# Patient Record
Sex: FEMALE | Race: WHITE
[De-identification: names, ages, dates, MRNs, and addresses within clinical notes are randomized per-mention and may not be internally consistent; named-entity substitution may affect disease eponyms.]

---

## 2017-04-25 ENCOUNTER — HOSPITAL ENCOUNTER (OUTPATIENT)
Dept: HOSPITAL 53 - M WUC | Age: 77
End: 2017-04-25
Attending: PHYSICIAN ASSISTANT
Payer: MEDICARE

## 2017-04-25 DIAGNOSIS — S93.422A: Primary | ICD-10-CM

## 2017-04-25 DIAGNOSIS — S93.602A: ICD-10-CM
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
## 2017-04-25 DIAGNOSIS — M77.32: ICD-10-CM
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
## 2017-04-25 NOTE — REP
Left foot four views:

 

Comparison is 12/06/2010.

 

The previous fracture of the base of the fifth digit metatarsal has healed in

satisfactory position alignment.

 

There is no acute fracture on the study today.  Mineralization joint spaces are

otherwise unremarkable and unchanged.

 

Calcaneal plantar and Achilles spurs are incidentally noted.

 

Impression:

 

Essentially negative left foot except for a calcaneal spurs.

 

 

Signed by

Jonatan Pickard MD 04/25/2017 12:35 P

## 2017-04-25 NOTE — REP
Left ankle four views :

 

There is no fracture or dislocation.

 

Mineralization and joint spaces are normal.

 

There are no calcifications or foreign bodies.

 

Impression:

 

Negative left ankle.  Calcaneal plantar and Achilles spurs are incidentally

noted.

 

 

Signed by

Jonatan Pickard MD 04/25/2017 12:33 P

## 2017-06-13 ENCOUNTER — HOSPITAL ENCOUNTER (OUTPATIENT)
Dept: HOSPITAL 53 - M LAB REF | Age: 77
End: 2017-06-13
Attending: FAMILY MEDICINE
Payer: MEDICARE

## 2017-06-13 DIAGNOSIS — N39.0: Primary | ICD-10-CM

## 2017-08-16 ENCOUNTER — HOSPITAL ENCOUNTER (OUTPATIENT)
Dept: HOSPITAL 53 - M LAB REF | Age: 77
End: 2017-08-16
Attending: NURSE PRACTITIONER
Payer: MEDICARE

## 2017-08-16 DIAGNOSIS — N39.0: Primary | ICD-10-CM

## 2018-07-26 ENCOUNTER — HOSPITAL ENCOUNTER (OUTPATIENT)
Dept: HOSPITAL 53 - M SDC | Age: 78
Discharge: HOME | End: 2018-07-26
Attending: SURGERY
Payer: MEDICARE

## 2018-07-26 DIAGNOSIS — I48.91: ICD-10-CM

## 2018-07-26 DIAGNOSIS — F32.9: ICD-10-CM

## 2018-07-26 DIAGNOSIS — Z96.1: ICD-10-CM

## 2018-07-26 DIAGNOSIS — I10: ICD-10-CM

## 2018-07-26 DIAGNOSIS — R29.898: ICD-10-CM

## 2018-07-26 DIAGNOSIS — Z92.21: ICD-10-CM

## 2018-07-26 DIAGNOSIS — K21.9: ICD-10-CM

## 2018-07-26 DIAGNOSIS — I25.2: ICD-10-CM

## 2018-07-26 DIAGNOSIS — R60.0: ICD-10-CM

## 2018-07-26 DIAGNOSIS — M12.9: ICD-10-CM

## 2018-07-26 DIAGNOSIS — Z96.641: ICD-10-CM

## 2018-07-26 DIAGNOSIS — Z79.82: ICD-10-CM

## 2018-07-26 DIAGNOSIS — E78.00: ICD-10-CM

## 2018-07-26 DIAGNOSIS — E03.9: ICD-10-CM

## 2018-07-26 DIAGNOSIS — Z78.0: ICD-10-CM

## 2018-07-26 DIAGNOSIS — Z79.899: ICD-10-CM

## 2018-07-26 DIAGNOSIS — C18.9: ICD-10-CM

## 2018-07-26 DIAGNOSIS — I25.119: ICD-10-CM

## 2018-07-26 DIAGNOSIS — K80.10: Primary | ICD-10-CM

## 2018-07-26 PROCEDURE — 47562 LAPAROSCOPIC CHOLECYSTECTOMY: CPT

## 2018-07-26 RX ADMIN — BUPIVACAINE HYDROCHLORIDE 1 ML: 2.5 INJECTION, SOLUTION EPIDURAL; INFILTRATION; INTRACAUDAL at 09:21

## 2018-11-07 ENCOUNTER — HOSPITAL ENCOUNTER (OUTPATIENT)
Dept: HOSPITAL 53 - M LAB REF | Age: 78
End: 2018-11-07
Attending: FAMILY MEDICINE
Payer: MEDICARE

## 2018-11-07 DIAGNOSIS — R10.9: Primary | ICD-10-CM

## 2018-11-07 LAB
AMYLASE SERPL-CCNC: 55 U/L (ref 25–115)
LIPASE: 233 U/L (ref 73–393)

## 2018-11-07 PROCEDURE — 82150 ASSAY OF AMYLASE: CPT

## 2018-11-14 ENCOUNTER — HOSPITAL ENCOUNTER (OUTPATIENT)
Dept: HOSPITAL 53 - M SDC | Age: 78
Discharge: HOME | End: 2018-11-14
Attending: OPHTHALMOLOGY
Payer: MEDICARE

## 2018-11-14 DIAGNOSIS — I25.2: ICD-10-CM

## 2018-11-14 DIAGNOSIS — E78.5: ICD-10-CM

## 2018-11-14 DIAGNOSIS — H25.9: Primary | ICD-10-CM

## 2018-11-14 DIAGNOSIS — I10: ICD-10-CM

## 2018-11-14 DIAGNOSIS — E03.9: ICD-10-CM

## 2018-11-14 DIAGNOSIS — I25.10: ICD-10-CM

## 2018-11-14 DIAGNOSIS — Z98.61: ICD-10-CM

## 2018-11-14 DIAGNOSIS — Z79.899: ICD-10-CM

## 2018-11-14 DIAGNOSIS — Z85.038: ICD-10-CM

## 2018-11-14 PROCEDURE — 66984 XCAPSL CTRC RMVL W/O ECP: CPT

## 2018-11-14 RX ADMIN — ACETYLCHOLINE CHLORIDE 1 DROP: KIT at 07:46

## 2018-11-14 RX ADMIN — ACETAZOLAMIDE 1 MG: 500 CAPSULE, EXTENDED RELEASE ORAL at 08:03

## 2018-11-14 RX ADMIN — LIDOCAINE HYDROCHLORIDE AND EPINEPHRINE 1 ML: 20; 5 INJECTION, SOLUTION EPIDURAL; INFILTRATION; INTRACAUDAL; PERINEURAL at 07:46

## 2018-11-14 RX ADMIN — OFLOXACIN 1 DROP: 3 SOLUTION/ DROPS OPHTHALMIC at 06:34

## 2018-11-14 RX ADMIN — TRIAMCINOLONE ACETONIDE 1 MG: 40 INJECTION, SUSPENSION OPHTHALMIC at 07:45

## 2018-11-14 RX ADMIN — Medication 1 MG: at 07:45

## 2018-11-14 RX ADMIN — LIDOCAINE HYDROCHLORIDE 1 ML: 10 INJECTION, SOLUTION EPIDURAL; INFILTRATION; INTRACAUDAL; PERINEURAL at 07:45

## 2018-11-14 RX ADMIN — LIDOCAINE HYDROCHLORIDE 1 %: 35 GEL OPHTHALMIC at 06:34

## 2018-11-14 RX ADMIN — POVIDONE-IODINE 1 DROP: 5 SOLUTION OPHTHALMIC at 07:45

## 2018-11-14 RX ADMIN — Medication 1 EA: at 07:45

## 2018-11-14 RX ADMIN — TROPICAMIDE 1 DROP: 10 SOLUTION/ DROPS OPHTHALMIC at 06:34

## 2018-11-14 RX ADMIN — CYCLOPENTOLATE HYDROCHLORIDE 1 DROP: 20 SOLUTION/ DROPS OPHTHALMIC at 06:33

## 2018-11-14 RX ADMIN — PHENYLEPHRINE HYDROCHLORIDE 1 DROP: 25 SOLUTION/ DROPS OPHTHALMIC at 06:33

## 2019-07-11 ENCOUNTER — HOSPITAL ENCOUNTER (OUTPATIENT)
Dept: HOSPITAL 53 - M LAB REF | Age: 79
End: 2019-07-11
Attending: FAMILY MEDICINE
Payer: MEDICARE

## 2019-07-11 DIAGNOSIS — R10.819: Primary | ICD-10-CM

## 2019-07-11 LAB
AMYLASE SERPL-CCNC: 54 U/L (ref 25–115)
LIPASE SERPL-CCNC: 250 U/L (ref 73–393)

## 2020-06-01 ENCOUNTER — HOSPITAL ENCOUNTER (OUTPATIENT)
Dept: HOSPITAL 53 - M LABSMTC | Age: 80
End: 2020-06-01
Attending: ANESTHESIOLOGY
Payer: MEDICARE

## 2020-06-01 DIAGNOSIS — Z11.59: ICD-10-CM

## 2020-06-01 DIAGNOSIS — Z01.818: Primary | ICD-10-CM

## 2020-06-04 ENCOUNTER — HOSPITAL ENCOUNTER (OUTPATIENT)
Dept: HOSPITAL 53 - M OPP | Age: 80
Discharge: HOME | End: 2020-06-04
Attending: SURGERY
Payer: MEDICARE

## 2020-06-04 VITALS — BODY MASS INDEX: 30.96 KG/M2 | HEIGHT: 65 IN | WEIGHT: 185.8 LBS

## 2020-06-04 VITALS — DIASTOLIC BLOOD PRESSURE: 75 MMHG | SYSTOLIC BLOOD PRESSURE: 134 MMHG

## 2020-06-04 DIAGNOSIS — I25.2: ICD-10-CM

## 2020-06-04 DIAGNOSIS — E03.9: ICD-10-CM

## 2020-06-04 DIAGNOSIS — Z98.0: ICD-10-CM

## 2020-06-04 DIAGNOSIS — Z12.11: Primary | ICD-10-CM

## 2020-06-04 DIAGNOSIS — Z80.0: ICD-10-CM

## 2020-06-04 DIAGNOSIS — Z79.82: ICD-10-CM

## 2020-06-04 DIAGNOSIS — D12.2: ICD-10-CM

## 2020-06-04 DIAGNOSIS — Z85.038: ICD-10-CM

## 2020-06-04 DIAGNOSIS — Z79.899: ICD-10-CM

## 2020-06-04 NOTE — ROOR
________________________________________________________________________________

Patient Name: Juliann Peng            Procedure Date: 6/4/2020 7:27 AM

MRN: [id number]                          Account Number: [id number]

YOB: 1940                Age: 80

Room: [facility]                            Gender: Female

Note Status: Finalized                 

________________________________________________________________________________

 

Procedure:           Colonoscopy

Indications:         High risk colon cancer surveillance: Personal history of 

                     colon cancer, Last colonoscopy: August 2016, Patient had 

                     a left hemicolectomy for cancer in 2007

Providers:           Howard Cano MD

Referring MD:        Shravan Huertas MD

Requesting Provider: 

Medicines:           Monitored Anesthesia Care

Complications:       No immediate complications.

________________________________________________________________________________

Procedure:           Pre-Anesthesia Assessment:

                     - Prior to the procedure, a History and Physical was 

                     performed, and patient medications and allergies were 

                     reviewed. The patient is competent. The risks and 

                     benefits of the procedure and the sedation options and 

                     risks were discussed with the patient. All questions were 

                     answered and informed consent was obtained. Patient 

                     identification and proposed procedure were verified by 

                     the physician, the nurse and the anesthetist in the 

                     procedure room. Mental Status Examination: alert and 

                     oriented. Airway Examination: normal oropharyngeal airway 

                     and neck mobility. Prophylactic Antibiotics: The patient 

                     does not require prophylactic antibiotics. Prior 

                     Anticoagulants: The patient has taken no previous 

                     anticoagulant or antiplatelet agents. ASA Grade 

                     Assessment: III - A patient with severe systemic disease. 

                     After reviewing the risks and benefits, the patient was 

                     deemed in satisfactory condition to undergo the 

                     procedure. The anesthesia plan was to use monitored 

                     anesthesia care (MAC). Immediately prior to 

                     administration of medications, the patient was 

                     re-assessed for adequacy to receive sedatives. The heart 

                     rate, respiratory rate, oxygen saturations, blood 

                     pressure, adequacy of pulmonary ventilation, and response 

                     to care were monitored throughout the procedure. The 

                     physical status of the patient was re-assessed after the 

                     procedure.

                     The Colonoscope was introduced through the anus and 

                     advanced to the cecum, identified by appendiceal orifice 

                     and ileocecal valve. The colonoscopy was performed 

                     without difficulty. The patient tolerated the procedure 

                     well. The quality of the bowel preparation was excellent.

                                                                                

Findings:

     The perianal and digital rectal examinations were normal.

     A 3 mm polyp was found in the proximal ascending colon. The polyp was 

     sessile. The polyp was removed with a jumbo cold forceps. Resection and 

     retrieval were complete. The pathology specimen was placed into Bottle 

     Number 1.

     There was evidence of a prior functional end-to-end colo-colonic 

     anastomosis in the proximal descending colon. This was patent and was 

     characterized by healthy appearing mucosa.

                                                                                

Impression:          - One 3 mm polyp in the proximal ascending colon, removed 

                     with a jumbo cold forceps. Resected and retrieved.

                     - Patent functional end-to-end colo-colonic anastomosis, 

                     characterized by healthy appearing mucosa.

Recommendation:      - Discharge patient to home.

                     - Resume previous diet.

                     - Continue present medications.

                     - Await pathology results.

                     - Repeat colonoscopy in 3 - 5 years for surveillance.

                                                                                

 

Howard Cano MD

__________________

Howard Cano MD

6/4/2020 7:56:05 AM

Electronically signed by Howard Cano MD

Number of Addenda: 0

 

Note Initiated On: 6/4/2020 7:27 AM

Estimated Blood Loss:

     Estimated blood loss: none.

## 2020-07-06 ENCOUNTER — HOSPITAL ENCOUNTER (OUTPATIENT)
Dept: HOSPITAL 53 - M WHC | Age: 80
End: 2020-07-06
Attending: SURGERY
Payer: MEDICARE

## 2020-07-06 DIAGNOSIS — N63.12: Primary | ICD-10-CM

## 2020-07-06 DIAGNOSIS — R59.9: ICD-10-CM

## 2020-07-06 DIAGNOSIS — Z79.899: ICD-10-CM

## 2020-07-06 DIAGNOSIS — Z79.82: ICD-10-CM

## 2020-07-06 NOTE — REP
ULTRASOUND RIGHT BREAST:

 

Real-time sonographic evaluation of the right breast performed.   Patient reports

right axillary soreness.  There is a morphologically normal appearing lymph node

in the right axilla with an echogenic fatty hilum.  It measures 1.7 x 0.6 x 1.4

cm.

 

Reportedly there is a palpable lump at 12-o'clock position 10 cm from the nipple.

At that location, a small hypoechoic area is seen with distal shadowing. This

measures approximately 3 mm.  This is probably benign.

 

IMPRESSION:

 

ACR3 probably benign.  Right axillary lymph node appears morphologically normal

in appearance with a normal short axis dimension.

 

At 12-o'clock position, a small 3 mm hypoechoic focus demonstrates distal

acoustic shadowing.  This is probably benign.  Recommend other 6-month followup

ultrasound with mammographic correlation, or ultrasound-guided biopsy.

## 2020-07-08 ENCOUNTER — HOSPITAL ENCOUNTER (OUTPATIENT)
Dept: HOSPITAL 53 - M WHCPRO | Age: 80
End: 2020-07-08
Attending: SURGERY
Payer: MEDICARE

## 2020-07-08 VITALS — DIASTOLIC BLOOD PRESSURE: 82 MMHG | SYSTOLIC BLOOD PRESSURE: 158 MMHG

## 2020-07-08 DIAGNOSIS — C50.311: Primary | ICD-10-CM

## 2020-07-08 DIAGNOSIS — N63.14: ICD-10-CM

## 2020-07-08 DIAGNOSIS — N63.12: ICD-10-CM

## 2020-07-08 NOTE — REP
ULTRASOUND GUIDANCE FOR TWO RIGHT BREAST BIOPSIES:

 

Real-time sonographic evaluation and ultrasound guidance provided for Dr. Newell for an ultrasound guided biopsy of a nodule at 4 -o'clock position

right breast. The nodule is seen on the sonographic images and a biopsy needle is

seen within the nodule.

 

Sonographic guidance is also provided biopsy of a hypoechoic nodule at 12

-o'clock position approximately 10 cm from the nipple.

## 2020-07-08 NOTE — REP
POST BIOPSY MAMMOGRAM RIGHT BREAST:

 

Postbiopsy mammogram right breast performed following ultrasound-guided biopsy of

two separate areas in the right breast.

 

A biopsy clip is seen at the previously identified nodule in the lower inner

right breast, originally seen by mammography at [facility]

05/19/2020.  Metallic clip is seen at the 12 -o'clock region at the site of a

second ultrasound biopsy performed today.  There was no mammographic abnormality

at that location.

## 2020-07-13 ENCOUNTER — HOSPITAL ENCOUNTER (OUTPATIENT)
Dept: HOSPITAL 53 - M PLALAB | Age: 80
End: 2020-07-13
Attending: SURGERY
Payer: MEDICARE

## 2020-07-13 DIAGNOSIS — Z13.79: Primary | ICD-10-CM

## 2020-07-14 ENCOUNTER — HOSPITAL ENCOUNTER (OUTPATIENT)
Dept: HOSPITAL 53 - M PLALAB | Age: 80
End: 2020-07-14
Attending: SURGERY
Payer: MEDICARE

## 2020-07-14 DIAGNOSIS — C50.311: Primary | ICD-10-CM

## 2020-07-14 LAB
BUN SERPL-MCNC: 17 MG/DL (ref 7–18)
CALCIUM SERPL-MCNC: 8.6 MG/DL (ref 8.8–10.2)
CHLORIDE SERPL-SCNC: 109 MEQ/L (ref 98–107)
CO2 SERPL-SCNC: 28 MEQ/L (ref 21–32)
CREAT SERPL-MCNC: 1.19 MG/DL (ref 0.55–1.3)
GFR SERPL CREATININE-BSD FRML MDRD: 46.5 ML/MIN/{1.73_M2} (ref 32–?)
GLUCOSE SERPL-MCNC: 98 MG/DL (ref 70–100)
POTASSIUM SERPL-SCNC: 4.9 MEQ/L (ref 3.5–5.1)
SODIUM SERPL-SCNC: 142 MEQ/L (ref 136–145)

## 2020-07-19 NOTE — ROOPDOC
[facility] Report Of Operation


Report of Operation


DATE OF PROCEDURE: 7/8/20





PREPROCEDURE DIAGNOSES: Right breast mass at 4:00 and 12:00





POSTPROCEDURE DIAGNOSES: Right breast mass at 4:00 and 12:00.





PROCEDURE: Ultrasound-guided biopsy of the right breast mass at 4:00 and 12:00 

with clip placement at both biopsy sites





SURGEON: Robe Dietz





ANESTHESIA: Local.





ESTIMATED BLOOD LOSS: Approximately 1 mL. 





COMPLICATIONS: none  





REMARKS: Post-biopsy mammogram of the right breast was obtained and showed clips

in expected position. Shape 4 clip was at the 4:00 mass location. Postprocedural

dressing was placed.





DESCRIPTION OF PROCEDURE: 





Lidocaine 1% -9729 Expiration 05/2023


Sodium Bicarbonate 8.4% LOT 06 313 EV Expiration 06/2021





4:00 BIOPSY SITE 


Hydromark clip LOT [id number] Expiration 02/2023 SHAPE 4  


Bx device: BARD [id number] x10 cm  LOT HU EQ 0691  Expiration 02/2023 





12:00 BIOPSY SITE 


Hydromark clip LOT [id number] Expiration 01/2023 SHAPE 3  


Bx device: BARD [id number] x10 cm  LOT HU EQ 0691  Expiration 02/2023





Informed consent was obtained. The most common risk and possible complications 

including bleeding, hematoma, bruising, infection, injury to surrounding 

structures were explained to the patient and she expressed understanding. 





Patient was placed on the bed in the supine position.  Appropriate time out was 

done stating patients name, date of birth, and the procedure to be performed. 

The right breast was prepped and draped in the usual fashion. The ultrasound was

used to confirm the location of the lesions in the right breast at 4:00 4CFN and

at 12:00 7CFN. 





Procedure was started with 4:00 4 cm from the nipple lesion. Plain Lidocaine 1% 

and 8.4% sodium bicarbonate 10:1 mix was used to anesthetize the skin, the 

biopsy site and tissues along the anticipated biopsy tract. Small skin incision 

was made with blade number 11.  BARD Marquee 14G cannula with introducer 

([id number]) was inserted through the incision and advanced under the ultrasound 

guidance to position immediately adjacent to the lesion. Next, the introducer 

was removed and BARD Marquee 14G biopsy device was places in the cannula. Pre-

biopsy imaging, and post-biopsy imaging were captured. Five good core biopsies 

were taken at various levels of the lesion. Specimen was placed in formaldehyde,

labeled with appropriate biopsy site and patients name, and sent to pathology 

for evaluation.





Next, the biopsy device was withdrawn and a clip introducer was inserted into 

the biopsy site via the cannula. The SHAPE 4 Hydromark clip was deployed under 

sonographic guidance. Post-clip placement image was captured. 





Manual pressure over the biopsy cavity and tract was held after the clip 

introducer was withdrawn.  No bleeding was noted upon removal of the pressure. 





Next, our attention was shifted toward 12:00 7 cm from the nipple lesion. Plain 

Lidocaine 1% and 8.4% sodium bicarbonate 10:1 mix was used to anesthetize the 

skin, the biopsy site and tissues along the anticipated biopsy tract. Small skin

incision was made with blade number 11.  BARD Marquee 14G cannula with 

introducer ([id number]) was inserted through the incision and advanced under the 

ultrasound guidance to position immediately adjacent to the lesion. Next, the 

introducer was removed and BARD Marquee 14G biopsy device was places in the 

cannula. Pre-biopsy imaging, and post-biopsy imaging were captured. Five good 

core biopsies were taken at various levels of the lesion. Specimen was placed in

formaldehyde, labeled with appropriate biopsy site and patients name, and sent 

to pathology for evaluation.





Next, the biopsy device was withdrawn and a clip introducer was inserted into 

the biopsy site via the cannula. The SHAPE 3 Hydromark clip was deployed under 

sonographic guidance. Post-clip placement image was captured. 





Manual pressure over the biopsy cavity and tract was held after the clip intro

ducer was withdrawn.  No bleeding was noted upon removal of the pressure. 





Post-biopsy mammogram of the right breast was obtained and showed clips in 

expected position. Shape 4 clip was at the 4:00 mass location. Postprocedural 

dressing was placed.





Patient tolerated procedure well. Discharge instructions were discussed with the

patient and she expressed understanding.














ROBE DIETZ DO     Jul 19, 2020 15:29

## 2020-08-05 ENCOUNTER — HOSPITAL ENCOUNTER (OUTPATIENT)
Dept: HOSPITAL 53 - M WHC | Age: 80
End: 2020-08-05
Attending: SURGERY
Payer: MEDICARE

## 2020-08-05 DIAGNOSIS — C50.911: Primary | ICD-10-CM

## 2020-08-18 ENCOUNTER — HOSPITAL ENCOUNTER (OUTPATIENT)
Dept: HOSPITAL 53 - M RAD | Age: 80
End: 2020-08-18
Attending: INTERNAL MEDICINE
Payer: MEDICARE

## 2020-08-18 DIAGNOSIS — Z95.1: ICD-10-CM

## 2020-08-18 DIAGNOSIS — K86.2: ICD-10-CM

## 2020-08-18 DIAGNOSIS — N63.15: ICD-10-CM

## 2020-08-18 DIAGNOSIS — I25.10: ICD-10-CM

## 2020-08-18 DIAGNOSIS — I71.00: Primary | ICD-10-CM

## 2020-08-18 PROCEDURE — 71275 CT ANGIOGRAPHY CHEST: CPT

## 2020-08-20 ENCOUNTER — HOSPITAL ENCOUNTER (OUTPATIENT)
Dept: HOSPITAL 53 - M LABSMTC | Age: 80
End: 2020-08-20
Attending: ANESTHESIOLOGY
Payer: MEDICARE

## 2020-08-20 DIAGNOSIS — Z01.812: Primary | ICD-10-CM

## 2020-08-20 DIAGNOSIS — Z20.828: ICD-10-CM

## 2020-08-25 ENCOUNTER — HOSPITAL ENCOUNTER (OUTPATIENT)
Dept: HOSPITAL 53 - M RADPRO | Age: 80
LOS: 1 days | Discharge: HOME | End: 2020-08-26
Attending: SURGERY
Payer: MEDICARE

## 2020-08-25 VITALS — DIASTOLIC BLOOD PRESSURE: 80 MMHG | SYSTOLIC BLOOD PRESSURE: 136 MMHG

## 2020-08-25 VITALS — HEIGHT: 60 IN | BODY MASS INDEX: 38.74 KG/M2 | WEIGHT: 197.31 LBS

## 2020-08-25 DIAGNOSIS — I25.10: ICD-10-CM

## 2020-08-25 DIAGNOSIS — C50.911: Primary | ICD-10-CM

## 2020-08-25 DIAGNOSIS — Z79.82: ICD-10-CM

## 2020-08-25 DIAGNOSIS — Z17.0: ICD-10-CM

## 2020-08-25 DIAGNOSIS — Z98.61: ICD-10-CM

## 2020-08-25 DIAGNOSIS — K21.9: ICD-10-CM

## 2020-08-25 DIAGNOSIS — Z85.038: ICD-10-CM

## 2020-08-25 DIAGNOSIS — Z79.899: ICD-10-CM

## 2020-08-25 DIAGNOSIS — I10: ICD-10-CM

## 2020-08-25 DIAGNOSIS — Z90.49: ICD-10-CM

## 2020-08-25 LAB
BUN SERPL-MCNC: 20 MG/DL (ref 7–18)
CALCIUM SERPL-MCNC: 8.8 MG/DL (ref 8.8–10.2)
CHLORIDE SERPL-SCNC: 110 MEQ/L (ref 98–107)
CO2 SERPL-SCNC: 25 MEQ/L (ref 21–32)
CREAT SERPL-MCNC: 1.33 MG/DL (ref 0.55–1.3)
GFR SERPL CREATININE-BSD FRML MDRD: 40.9 ML/MIN/{1.73_M2} (ref 32–?)
GLUCOSE SERPL-MCNC: 138 MG/DL (ref 70–100)
HCT VFR BLD AUTO: 33.8 % (ref 36–47)
HGB BLD-MCNC: 11.4 G/DL (ref 12–15.5)
MCH RBC QN AUTO: 33.9 PG (ref 27–33)
MCHC RBC AUTO-ENTMCNC: 33.7 G/DL (ref 32–36.5)
MCV RBC AUTO: 100.6 FL (ref 80–96)
PLATELET # BLD AUTO: 132 10^3/UL (ref 150–450)
POTASSIUM SERPL-SCNC: 4.4 MEQ/L (ref 3.5–5.1)
RBC # BLD AUTO: 3.36 10^6/UL (ref 4–5.4)
SODIUM SERPL-SCNC: 142 MEQ/L (ref 136–145)
WBC # BLD AUTO: 7.8 10^3/UL (ref 4–10)

## 2020-08-25 PROCEDURE — 86850 RBC ANTIBODY SCREEN: CPT

## 2020-08-25 PROCEDURE — 78195 LYMPH SYSTEM IMAGING: CPT

## 2020-08-25 PROCEDURE — 80048 BASIC METABOLIC PNL TOTAL CA: CPT

## 2020-08-25 PROCEDURE — 86901 BLOOD TYPING SEROLOGIC RH(D): CPT

## 2020-08-25 PROCEDURE — 88305 TISSUE EXAM BY PATHOLOGIST: CPT

## 2020-08-25 PROCEDURE — 38525 BIOPSY/REMOVAL LYMPH NODES: CPT

## 2020-08-25 PROCEDURE — 88307 TISSUE EXAM BY PATHOLOGIST: CPT

## 2020-08-25 PROCEDURE — 85027 COMPLETE CBC AUTOMATED: CPT

## 2020-08-25 PROCEDURE — 76942 ECHO GUIDE FOR BIOPSY: CPT

## 2020-08-25 PROCEDURE — 19125 EXCISION BREAST LESION: CPT

## 2020-08-25 PROCEDURE — 19126 EXCISION ADDL BREAST LESION: CPT

## 2020-08-25 PROCEDURE — 36415 COLL VENOUS BLD VENIPUNCTURE: CPT

## 2020-08-25 PROCEDURE — 86900 BLOOD TYPING SEROLOGIC ABO: CPT

## 2020-08-25 RX ADMIN — Medication SCH EA: at 09:00

## 2020-08-25 NOTE — ROOPDOC
[facility] Report Of Operation


Report of Operation


DATE OF PROCEDURE: 8/25/20





PREPROCEDURE DIAGNOSES: Right breast cancer and right breast suspicious lesion





POSTPROCEDURE DIAGNOSES: same





PROCEDURE: Right breast lumpectomy x 2 with intraop wire placement x2 and right 

sentinel lymph node biopsy





SURGEON: Robe Saavedra





ASSISTANT: 





ANESTHESIA: general





ESTIMATED BLOOD LOSS: Approximately 25 mL. 





COMPLICATIONS: none 





REMARKS: clip was identified during dissection of 12:00 lesion and removed from 

the tissue to avoid misplacement, The clip is present in the 4:00 lumpectomy 

specimens. Both specimens contain wires








DESCRIPTION OF PROCEDURE: 





INDICATIONS: 


Ms. Peng is an 80-year-old woman who was found to have a suspicious right 

breast mass located at 4:00 on screening mammogram. This was evaluated with US 

and sonographic correlate was found and biopsy was recommended. On clinical 

breast exam there was another area of palpable lesion at 12:00.  This lesion was

evaluated with focal US of this lesion and category BIRADS 3 was assigned to 

this study and either biopsy or image follow up was recommended. Patient decided

that she would like to have biopsy of both lesions. 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 US 

guided biopsy of the 12:00 lesion came back as fat necrosis. MRI of the breast 

was done and showed abnormal contrast enhancement at the site of known cancer 

and some suspicious enhancement at the site of 12:00 biopsy. Malignancy could 

not be ruled out per radiology report.  We have discussed various treatment 

options and patient decided that she would want to have both lesions removed. 

She opted for breast conservative surgery with sentinel lymph node biopsy on the

right. She was medically cleared for surgery by her primary care doctor and by 

the cardiology since patient has a hx of open heart surgery.





Risks and possible complications of surgical procedure including bleeding, 

infection and injury to surrounding structures were explained to the patient and

she wished to proceed. Consent was signed. My initials were placed on the 

operative site. Subcutaneous injection of 5000 units of heparin was done in 

Preop. The injection of radioactive tracer was done in radiology department 

preoperatively. Lymphoscintigraphy imaging was reviewed in preop. 





DETAILS:


Patient was taken to the operating room and placed on the operating room table. 

A sign in was called stating patients name, date of birth and the procedure to 

be done. Preoperative antibiotics were infused. Smooth induction of general 

anesthesia was done. Patients hands were extended on arm rests.  Care was taken 

not to over extend the arms.  





Procedure was started with right breast intraop wire localization of 12:00 

lesion and 4:00 lesion. Appropriate time out was done and patients name, date 

of birth, and the procedure to be done were confirmed. Right breast was cleaned 

by me. Intraoperative ultrasound was used to confirm location of the Hydromark 

clip and 12:00 and at 4:00. Location of the clip was marked on the skin as well 

at both locations. We started with wire placement at the 12:00 lesion.  21 G 

Kopans Breast Lesion Localization Needle was used to place 25 cm wire through 

the clip and the end and the wire was passed a centimeter deep. The images were 

captured confirming adequate placement of the localizing wire. Next, we 

proceeded with wire placement at 4:00. 21 G Kopans Breast Lesion Localization 

Needle was used to place 25 cm wire through the lesion. The clip was identified 

immediately next to the lesion. The images were captured confirming adequate 

placement of the localizing wire. Sonographer assisted with the wire placement. 





Next, patients right breast and axilla were prepped and draped in the usual 

fashion. Care was taken not to displace the wire. Appropriate time out was done 

again prior second part of the procedure. Patients name, date of birth, and the

procedure to be done were confirmed. 





Procedure was started with sentinel lymph node biopsy. Neoprobe was used to 

locate area of maximum intensity of the signal. Local anesthetic using 1% 

lidocaine and 0.25 % Marcaine 50/50 mix was injected. An incision was made with 

scalpel number 15 at the inferior aspect of axillary hair line in the right 

axilla where the maximum signal was identified. The sharp and blunt dissection 

was continued through the subcutaneous adipose tissue. Clavipectoral fascia was 

opened. Neoprobe was used to guide the dissection.  First sentinel lymph node 

was identified and excised. The ex-vivo 10 second count was 5001. Second 

sentinel lymph node was next to the first node. This was also excised. The ex-

vivo 10 second count was 17. The third sentinel lymph node was identified and 

excised. The ex-vivo 10 second count was 3120. The specimens were labeled with 

patients name and sent to pathology. No additional lymph nodes with high 

radioactive signal were identified. The 10 second count of the background was 

81.  Adequate hemostasis was assured. Additional local anesthetic was injected 

into surrounding tissues.





At this time, the axillary incision was used to access the lesion locates at 

12:00. The tissue flaps were raised from the incision site toward the 12:00 

Hydromark clip. The previously placed guide wire was carefully pulled into the 

wound. Hydromark clip was identified with intraoperative US using hockey stick 

probe. Dissection was carried toward the Hydromark along the wire. Hydromark 

clip was noted to be visible during tissue dissection and removed to avoid losin

g the clip. This was later imaged with the specimen. Black stitch was placed 

into the specimen at the site where clip was present. Surrounding tissue along 

the wire and the previously identified clip site was excised. The specimen was 

carefully removed from the breast keeping its proper orientation and moved to 

the back table where margins were marked with the surgical inking kit following 

the standard colors recommendations. Specimen was then placed on the grid and 

placed in [employer] [employer] [facility]. The image revealed the wire. The 

Hydromark clip previously identified in the tissue was placed next to the 

specimen. The specimen was labeled with patients name and right 12:00 

lumpectomy and sent to pathology. 





Axillary and breast cavities were was irrigated. Breast dead space was 

approximated with 2-0 Vicryl. Clavipectoral fascia was closed with 3-0 Vicryl 

interrupted suture. Dermal layer was closed at the end of the case with 3-0 

Monocryl and skin was closed with 4-0 Monocryl. Surgical glue was applied to the

incision at the end of the procedure. 





Next, our attention was turned toward the right 4:00 cancer site. Local anest

hetic using 1% lidocaine and 0.25 % Marcaine 50/50 mix was injected at the site 

of planned periareolar incision. The incision was made with the scalpel. 

Subcutaneous skin flaps were raised and the guide wire was carefully pulled into

the wound. Dissection was carries along the wire until the previously marked on 

the skin area of target lesion location was encountered.  At this point, wider 

excision of the tissue surrounding the wire was done. The Hydromark clip was 

identified in the tissue with intraoperative hockey stick ultrasound probe. 

Palpation of the mass was guiding the dissection.  The lumpectomy specimen was 

carefully removed from the breast keeping its proper orientation and moved to 

the back table where margins were marked with the surgical inking kit following 

the standard colors recommendations. Specimen was then placed on the grid and 

placed in [employer] [employer] [facility]. The image revealed the wire inside the

mass and the Hydromark in the specimen. The specimen was labeled with patients 

name and right 4:00 lumpectomy and sent to pathology. 





Next, six additional margins were taken: deep, inferior, superior, medial, 

anterior and lateral.  All new margins, defined as margin farthest away from 

lumpectomy cavity, were marked with black ink. Each margin was sent as a 

separate specimen with appropriate labeling. Wound was thoroughly irrigated. 

Adequate hemostasis was assured. Additional local anesthetic was injected into 

surrounding tissues. Clips were placed to megan the cavity. Dead space was 

approximated with 2-0 Vicryl. The dermis was closed with 3-0 Monocryl and skin 

was closed with 4-0 Monocryl.  Surgical glue was placed over the incision. 





Patient emerged from the anesthesia without any problems. Fluffs were placed 

over the operative site and patients chest was wrapped snuggly in the ACE wrap.







Sponge and instrument counts were done and were correct.





Patient tolerated procedure well and was taken to recovery unit in stable 

condition.




















ROBE DIETZ DO     Aug 25, 2020 21:44

## 2020-08-25 NOTE — HPEPDOC
[facility] Medical History & Physical


Date of Admission


Aug 25, 2020


Date of Service:  Aug 25, 2020


Attending Physician:  NATHAN SKY MD





History and Physical


ATTENDING: Dr. Nathan Sky 


Surgeon: Dr. Newell 





CHIEF COMPLAINT: High blood pressure





HISTORY OF PRESENT ILLNESS: 


81 y/o F with PMHx CAD s/p CABGx5  in 2004, Colon ca s/p resection 2007, HTN, 

GERD who presents for lumpectomy and sentinel node dissection for concern of 

suspicious breast lesion.  





Pt seen post op mildly hypertensive [id number]. Pt did not take her BP meds 

today. 





Pt denies SOB/palpitations. No N/V/abd pain. Overall feels ok. 





We have been asked to keep pt overnight and monitor given prolonged OR time and 

cardiac history. 





PAST MEDICAL HISTORY: As per HPI





PAST SURGICAL HISTORY: CABG, Colectomy for colon ca, hip surg, right eye surg





SOCIAL HISTORY: Denies tobacco, alcohol use.





FAMILY HISTORY: Non contributory





ALLERGIES: Please see below.





REVIEW OF SYSTEMS:


HEENT: Denies sore throat/headache


CARDIOVASCULAR: Denies palpitations


RESPIRATORY: Denies shortness of breath/cough


GASTROINTESTINAL: denies nausea/vomiting


GENITOURINARY: Denies dysuria/urinary urgency.


MUSCULOSKELETAL: Denies myalgias/arthralgias


NEUROLOGICAL: Denies any focal weakness


HOME MEDICATIONS: Please see below. 


PHYSICAL EXAMINATION:





Vitals: (see below) 


General: No acute distress, laying comfortably in bed.


HEENT: Moist mucous membranes.


Neck: No JVD or lymphadenopathy


Cardiac: RRR, No murmurs. Chest wall with ACE bandage


Pulm: Clear to auscultation b/l. No wheezing, rhonchi


Abd: NT/ND + BS


Ext: No edema or cyanosis. Distal pulses intact 





LABORATORY DATA: See below.


 


ASSESSMENT/PLAN: 


1. POD #0 s/p lumpectomy x2 with sentinel lymph node biopsy. - management per 

Dr. Newell


2. HTN - uncontrolled. Restart Lisinopril. hydralazine IV PRN


3. H/o CAD s/p cabg - restart home meds, except ASA until cleared by surg


4. H/o colon ca s/p resection 


5. H/o gerd on ppi 





DVT Prophy: SCDs





Pt expected to be hospitalized for <2midnights for the monitoring of the above.





Vital Signs





Vital Signs








  Date Time  Temp Pulse Resp B/P (MAP) Pulse Ox O2 Delivery O2 Flow Rate FiO2


 


8/25/20 18:01   20     


 


8/25/20 17:40 97.7 65  165/73 (103) 97 Room Air  


 


8/25/20 16:25       2 











Home Medications


Scheduled


Aspirin (Ecotrin) 81 Mg Tablet.dr, 81 MG PO DAILY


Cholecalciferol (Vitamin D3) (Vitamin D3) 25 Mcg Capsule, 25 MCG PO DAILY


Levothyroxine Sodium (Levothyroxine Sodium) 88 Mcg Tab, 88 MCG PO ASDIRECTED


   TUESDAY, WED, FRIDAY, SAT, SUN 


Levothyroxine Sodium (Levo-T) 75 Mcg Tablet, 75 MCG PO 2XW


   MONDAY AND THURSDAY 


Lisinopril (Lisinopril) 2.5 Mg Tablet, 2.5 MG PO DAILY


Multivitamin (Multivitamins) 1 Cap Cap, 1 CAP PO DAILY


Nitroglycerin (Nitroglycerin) 0.4 Mg Sub, 0.4 MG SL ASDIRECTED for chest pain


   1st sign of attack; may repeat every 5 mins; if pain persists after 3 in 15 

min, medical attention is recommended 


Pantoprazole Sodium (Pantoprazole Sodium) 40 Mg Tab, 40 MG PO DAILY


Simvastatin (Simvastatin) 40 Mg Tab, 40 MG PO DAILY





Scheduled PRN


Acetaminophen (Tylenol) 325 Mg Tablet, 650 MG PO Q4-6HP PRN for PAIN OR FEVER


Tramadol HCl (Ultram) 50 Mg Tablet, 50 MG PO every 6 hours PRN for pain





Allergies


Coded Allergies:  


     No Known Allergies (Unverified , 11/13/18)





A-FIB/CHADSVASC


A-FIB History


Current/History of A-Fib/PAF?:  No











NATHAN SKY MD                 Aug 25, 2020 18:16

## 2020-08-26 VITALS — SYSTOLIC BLOOD PRESSURE: 131 MMHG | DIASTOLIC BLOOD PRESSURE: 82 MMHG

## 2020-08-26 VITALS — DIASTOLIC BLOOD PRESSURE: 70 MMHG | SYSTOLIC BLOOD PRESSURE: 136 MMHG

## 2020-08-26 VITALS — SYSTOLIC BLOOD PRESSURE: 135 MMHG | DIASTOLIC BLOOD PRESSURE: 76 MMHG

## 2020-08-26 VITALS — SYSTOLIC BLOOD PRESSURE: 152 MMHG | DIASTOLIC BLOOD PRESSURE: 74 MMHG

## 2020-08-26 LAB
BUN SERPL-MCNC: 19 MG/DL (ref 7–18)
CALCIUM SERPL-MCNC: 8.8 MG/DL (ref 8.8–10.2)
CHLORIDE SERPL-SCNC: 111 MEQ/L (ref 98–107)
CO2 SERPL-SCNC: 26 MEQ/L (ref 21–32)
CREAT SERPL-MCNC: 1.17 MG/DL (ref 0.55–1.3)
GFR SERPL CREATININE-BSD FRML MDRD: 47.4 ML/MIN/{1.73_M2} (ref 32–?)
GLUCOSE SERPL-MCNC: 93 MG/DL (ref 70–100)
HCT VFR BLD AUTO: 31.7 % (ref 36–47)
HGB BLD-MCNC: 10.5 G/DL (ref 12–15.5)
MCH RBC QN AUTO: 33.2 PG (ref 27–33)
MCHC RBC AUTO-ENTMCNC: 33.1 G/DL (ref 32–36.5)
MCV RBC AUTO: 100.3 FL (ref 80–96)
PLATELET # BLD AUTO: 128 10^3/UL (ref 150–450)
POTASSIUM SERPL-SCNC: 4.1 MEQ/L (ref 3.5–5.1)
RBC # BLD AUTO: 3.16 10^6/UL (ref 4–5.4)
SODIUM SERPL-SCNC: 143 MEQ/L (ref 136–145)
WBC # BLD AUTO: 8.6 10^3/UL (ref 4–10)

## 2020-08-26 RX ADMIN — LISINOPRIL SCH MG: 2.5 TABLET ORAL at 08:33

## 2020-08-26 RX ADMIN — LISINOPRIL SCH MG: 2.5 TABLET ORAL at 00:38

## 2020-08-26 RX ADMIN — Medication SCH EA: at 08:44

## 2020-08-26 NOTE — DS.PDOC
Discharge Summary


General


Date of Admission


8/25/20


Date of Discharge


8/26/20


Primary Care Physician:  DAV YAÑEZ M.D.


Attending Physician:  ROBE DIETZ DO





Discharge Summary


PROCEDURES PERFORMED DURING STAY: None.





ADMITTING/DISCHARGE DIAGNOSES:


1. POD #1 s/p lumpectomy x2 with sentinel lymph node biopsy. - management per 

Dr. Dietz


2. HTN - controlled now


3. H/o CAD s/p cabg 


4. H/o colon ca s/p resection 


5. H/o gerd on ppi 





COMPLICATIONS/CHIEF COMPLAINT: Post lumpectomy





HISTORY OF PRESENT ILLNESS/HOSPITAL COURSE:


81 y/o F with PMHx CAD s/p CABGx5  in 2004, Colon ca s/p resection 2007, HTN, 

GERD who presents for lumpectomy and sentinel node dissection for concern of 

suspicious breast lesion.  





Pt seen post op mildly hypertensive [id number]. Pt did not take her BP meds 

today. 





Pt denies SOB/palpitations. No N/V/abd pain. Overall feels ok. 





We have been asked to keep pt overnight and monitor given prolonged OR time and 

cardiac history. 





Pt remained hemodynamically stable. No acute changes overnight. BP stable. 





Cleared for d/c by Dr. Dietz. Restart ASA when ok with Dr. Dietz.





DISCHARGE MEDICATIONS: Please see below.





ALLERGIES: Please see below.





PHYSICAL EXAMINATION ON DISCHARGE:


Vitals: (see below) 


General: No acute distress, laying comfortably in bed.


HEENT: Moist mucous membranes.


Neck: No JVD or lymphadenopathy


Cardiac: RRR, No murmurs. ACE bandage in place. 


Pulm: Clear to auscultation b/l. No wheezing, rhonchi


Abd: NT/ND + BS


Ext: No edema or cyanosis. Distal pulses intact.





LABORATORY DATA: Please see below.


 


PROGNOSIS: Fair





ACTIVITY: As tolerated.





DIET: Cardiac diet





DISCHARGE PLAN/DISPOSITION: Home





DISCHARGE INSTRUCTIONS:


1. F/u with PCP and Cardiology in 1-2 weeks. F/u with Dr. Dietz as 

instructed; f/u with oncologist/surgeon for pathology results.





DISCHARGE CONDITION: Stable.





TIME SPENT ON DISCHARGE: 25 minutes.





Vital Signs/I&Os





Vital Signs








  Date Time  Temp Pulse Resp B/P (MAP) Pulse Ox O2 Delivery O2 Flow Rate FiO2


 


8/26/20 10:00 98.2 68 17 135/76 (95) 97 Room Air  


 


8/25/20 16:25       2 














I&O- Last 24 Hours up to 6 AM 


 


 8/26/20





 06:00


 


Intake Total 3140 ml


 


Output Total 575 ml


 


Balance 2565 ml











Laboratory Data


Labs 24H


Laboratory Tests 2


8/25/20 22:45: 


Nucleated Red Blood Cells % (auto) 0.0, Anion Gap 7L, Glomerular Filtration Rate

40.9, Calcium Level 8.8


8/26/20 06:22: 


Nucleated Red Blood Cells % (auto) 0.0, Anion Gap 6L, Glomerular Filtration Rate

47.4, Calcium Level 8.8


CBC/BMP


Laboratory Tests


8/25/20 22:45








8/26/20 06:22











Discharge Medications


Scheduled


Aspirin (Ecotrin) 81 Mg Tablet.dr, 81 MG PO DAILY, (Reported)


Cholecalciferol (Vitamin D3) (Vitamin D3) 25 Mcg Capsule, 25 MCG PO DAILY, (R

eported)


Levothyroxine Sodium (Levothyroxine Sodium) 88 Mcg Tab, 88 MCG PO ASDIRECTED, 

(Reported)


   TUESDAY, WED, FRIDAY, SAT, SUN 


Levothyroxine Sodium (Levo-T) 75 Mcg Tablet, 75 MCG PO 2XW, (Reported)


   MONDAY AND THURSDAY 


Lisinopril (Lisinopril) 2.5 Mg Tablet, 2.5 MG PO DAILY, (Reported)


Multivitamin (Multivitamins) 1 Cap Cap, 1 CAP PO DAILY, (Reported)


Nitroglycerin (Nitroglycerin) 0.4 Mg Sub, 0.4 MG SL ASDIRECTED for chest pain, 

(Reported)


   1st sign of attack; may repeat every 5 mins; if pain persists after 3 in 15 

min, medical attention is recommended 


Pantoprazole Sodium (Pantoprazole Sodium) 40 Mg Tab, 40 MG PO DAILY, (Reported)


Simvastatin (Simvastatin) 40 Mg Tab, 40 MG PO DAILY, (Reported)





Scheduled PRN


Acetaminophen (Tylenol) 325 Mg Tablet, 650 MG PO Q4-6HP PRN for PAIN OR FEVER, 

(Reported)


Tramadol HCl (Ultram) 50 Mg Tablet, 50 MG PO every 6 hours PRN for pain





Allergies


Coded Allergies:  


     No Known Allergies (Unverified , 11/13/18)











NATHAN SKY MD                 Aug 26, 2020 12:03

## 2020-08-26 NOTE — IPNPDOC
Subjective


General


Date Seen:  Aug 26, 2020





Subject


Chief Complaint/History


The patient is a 80-year-old female admitted with a reason for visit of Right 

Breast Cancer And Suspicious Lesion. 


s/p R lumpectomy x 2 and R SLNBx POD1


Patient is doing well postop. There were no issues with her heart overnight. Her

vitals remained stable. She was able to tolerate food and she voided. She did 

not require pain medications.





Current Medications


Current Medications





Current Medications








 Medications


  (Trade)  Dose


 Ordered  Sig/Supriya


 Route


 PRN Reason  Start Time


 Stop Time Status Last Admin


Dose Admin


 


 Acetaminophen


  (Tylenol Tab)  500 mg  Q6HP  PRN


 PO


 PAIN 1-3  8/25/20 22:15


     





 


 Acetaminophen


  (Tylenol Tab)  1,000 mg  Q6HP  PRN


 PO


 PAIN 4-6  8/25/20 22:15


     





 


 Fentanyl Citrate


  (Sublimaze)  25 mcg  Q5MP  PRN


 IV


 PAIN LEVEL 5-10  8/25/20 17:00


 8/25/20 18:00 DC  





 


 Fentanyl Citrate


  (Sublimaze)  25 mcg  Q5MP  PRN


 IV


 PAIN LEVEL 5-10  8/25/20 22:00


 8/25/20 22:59 DC  





 


 Hydralazine HCl


  (Apresoline)  10 mg  Q6HP  PRN


 PO


 SBP>160   8/25/20 18:30


     





 


 Hydromorphone HCl


  (Dilaudid)  0.2 mg  Q5MP  PRN


 IV


 PAIN LEVEL 4-7  8/25/20 17:00


 8/25/20 18:00 DC 8/25/20 18:01





 


 Hydromorphone HCl


  (Dilaudid)  0.2 mg  Q5MP  PRN


 IV


 PAIN LEVEL 4-7  8/25/20 22:00


 8/25/20 22:59 DC  





 


 Lactated Ringer's  1,000 ml @ 


 100 mls/hr  Q10H


 IV


   8/25/20 17:00


 8/25/20 18:00 DC  





 


 Lactated Ringer's  1,000 ml @ 


 100 mls/hr  Q10H


 IV


   8/25/20 22:00


 8/25/20 22:59 DC  





 


 Lisinopril


  (Prinivil)  2.5 mg  DAILY


 PO


   8/25/20 22:41


    8/26/20 08:33





 


 Miscellaneous


  (Unresolved


 Clarification


 Entry)  SEE LABEL


 COMMENTS  DAILY


 XX


   8/25/20 09:00


     





 


 Ondansetron HCl


  (ZOFRAN


 INJection)  4 mg  Q4HP  PRN


 IV


 NAUSEA OR VOMITING  8/25/20 17:00


 8/25/20 18:00 DC 8/25/20 16:45





 


 Ondansetron HCl


  (ZOFRAN


 INJection)  4 mg  Q4HP  PRN


 IV


 NAUSEA OR VOMITING  8/25/20 22:00


 8/25/20 22:59 DC  





 


 Oxycodone HCl


  (Roxicodone,


 Oxyir)  5 mg  ASDIRECTED  PRN


 PO


 PAIN LEVEL 1-4  8/25/20 17:00


 8/25/20 18:00 DC  





 


 Oxycodone HCl


  (Roxicodone,


 Oxyir)  5 mg  ASDIRECTED  PRN


 PO


 PAIN LEVEL 1-4  8/25/20 22:00


 8/25/20 22:59 DC  





 


 Sodium Chloride  1,000 ml @ 


 15 mls/hr  Q24H


 IV


   8/25/20 09:45


 8/25/20 16:56 DC  





 


 Tramadol HCl


  (Ultram)  50 mg  Q6HP  PRN


 PO


 PAIN 7-10  8/25/20 22:15


     














Allergies


Coded Allergies:  


     No Known Allergies (Unverified , 11/13/18)





Objective


Physical Examination


Examination


GENERAL APPEARANCE:Patient seen, laying in bed, awake, alert, and oriented. 

Comfortable, in no acute distress.


SKIN: Warm and moist.


BREAST: Right axillary incision and the right periareolar incision are well 

approximated. There is no signs of hematoma. There is no excessive ecchymosis


LUNGS: Breathing comfortably on room air


HEART: Not tachycardic


ABDOMEN: Abdomen soft


EXTREMITIES: Using right upper extremity without issues


Vital Signs





Vital Signs








  Date Time  Temp Pulse Resp B/P (MAP) Pulse Ox O2 Delivery O2 Flow Rate FiO2


 


8/26/20 10:00 98.2 68 17 135/76 (95) 97 Room Air  


 


8/25/20 16:25       2 








I&Os











I&O- Last 24 Hours up to 6 AM 


 


 8/26/20





 06:00


 


Intake Total 3140 ml


 


Output Total 575 ml


 


Balance 2565 ml











Laboratory Data


Labs 24H


Laboratory Tests 2


8/25/20 22:45: 


Nucleated Red Blood Cells % (auto) 0.0, Anion Gap 7L, Glomerular Filtration Rate

40.9, Calcium Level 8.8


8/26/20 06:22: 


Nucleated Red Blood Cells % (auto) 0.0, Anion Gap 6L, Glomerular Filtration Rate

47.4, Calcium Level 8.8


CBC/BMP


Laboratory Tests


8/25/20 22:45








8/26/20 06:22











Impression


80-year-old female with history of right breast cancer at 4:00 and right breast 

suspicious lesion at 12:00 status post right breast lumpectomy 2 and right 

sentinel lymph node biopsy POD1





-Continue incentive spirometer


-Keep Ace wrap till tomorrow


-Use Dr. Dietz preprinted postoperative instructions


-Prescription for tramadol was sent to pharmacy


-Postop schedule for Monday 9:30 in the morning


-stable for discharge, this was communicated with nurse Joy





Plan / VTE


VTE Prophylaxis Ordered?:  Yes











ROBE DIETZ DO     Aug 26, 2020 12:33

## 2020-09-02 NOTE — REP
NEEDLE LOCALIZATION UNDER SONOGRAPHIC GUIDANCE



Sonographic guidance is provided to Dr. Newell who performed ultrasound-
guided Kopans wire needle localization and HydroMARK clip placement procedure. 

FAMILIA

## 2020-09-19 LAB
BUN SERPL-MCNC: 19 MG/DL (ref 7–18)
CALCIUM SERPL-MCNC: 9.1 MG/DL (ref 8.8–10.2)
CHLORIDE SERPL-SCNC: 112 MEQ/L (ref 98–107)
CO2 SERPL-SCNC: 28 MEQ/L (ref 21–32)
CREAT SERPL-MCNC: 1.19 MG/DL (ref 0.55–1.3)
GFR SERPL CREATININE-BSD FRML MDRD: 46.5 ML/MIN/{1.73_M2} (ref 32–?)
GLUCOSE SERPL-MCNC: 94 MG/DL (ref 70–100)
POTASSIUM SERPL-SCNC: 4.9 MEQ/L (ref 3.5–5.1)
SODIUM SERPL-SCNC: 143 MEQ/L (ref 136–145)

## 2020-09-21 NOTE — REP
RIGHT BREAST LYMPHOSCINTIGRAPHY 



The procedure was performed under the direct supervision of Dr. Martin. The images 
were reviewed with Dr. Martin.



The risks and benefits of the procedure were explained to the patient and 
informed consent was obtained. 



Using topical anesthetic and sterile technique, 1.029 mCi of Technetium-99m 
filtered sulfur colloid was injected subdermally in eight fractionated 
periareolar injections. Images obtained one hour of injections showed two foci 
of donaldo uptake in the axillary region on the right.



IMPRESSION: 

Right breast lymphoscintigraphy. There are two foci of donaldo uptake in the 
axillary region on the right.

MTDD

## 2020-09-25 ENCOUNTER — HOSPITAL ENCOUNTER (OUTPATIENT)
Dept: HOSPITAL 53 - M ONCR | Age: 80
End: 2020-09-25
Attending: RADIOLOGY
Payer: MEDICARE

## 2020-09-25 DIAGNOSIS — C50.919: Primary | ICD-10-CM

## 2020-09-28 ENCOUNTER — HOSPITAL ENCOUNTER (OUTPATIENT)
Dept: HOSPITAL 53 - M WHC | Age: 80
End: 2020-09-28
Attending: INTERNAL MEDICINE
Payer: MEDICARE

## 2020-09-28 DIAGNOSIS — Z78.0: ICD-10-CM

## 2020-09-28 DIAGNOSIS — C50.311: Primary | ICD-10-CM

## 2020-09-28 PROCEDURE — 77080 DXA BONE DENSITY AXIAL: CPT

## 2020-10-06 NOTE — DEXA
AP SPINE   L1 - L4   1.034   -1.3      0.5

LT FEMUR   TOTAL   0.822   -1.5      0.5

LT NECK      0.861   -1.3      0.9

RT FEMUR   TOTAL                  

RT NECK               

TOTAL BODY   TOTAL

OTHER



COMMENTS:

There is low bone density of the spine.

There is low bone density of the left hip.



FOLLOW-UP:

Recommendation for the next bone density exam: 2 years



FAMILIA

## 2020-10-06 NOTE — REP
SPECIMEN RADIOGRAPHS 



Three specimen radiographs are performed. Two sites of lumpectomy were localized
with ultrasound guidance for Dr. Newell and two separate lumpectomies were 
apparently performed. 



FINDINGS: 

The first image shows a biopsy clip, which was at the 12 o'clock position of the
right breast along with a few tiny soft tissue fragments. The second image shows
a large soft tissue fragment containing a Kopans wire. The third image shows the
nodule that was seen in the inferomedial right breast and this appears to have 
adequate soft tissue margins circumferentially. The previously noted biopsy clip
is again seen at the margin of the nodule.

WILLD

## 2020-10-29 NOTE — RADONC
RADIATION ONCOLOGY CONSULTATION NOTE



DATE:  09/25/2020



Chart #



DIAGNOSIS:

1.   Right breast cancer. 



Stage IA, pT1c, pN0, M0, ER positive, PRx positive, HER2-juanita negative, Grade 2 
mucinous adenocarcinoma.



ECOG performance status 0.



CONSULTATION NOTE: Ms. Peng is a very pleasant 80-year-old white female with a
diagnosis of what appears to be a Stage IA, pT1c, pN0, M0, moderately 
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 
position which is estrogen receptor positive, progesterone receptive positive 
and HER2-juanita negative well-healed is presenting to us today status post 
lumpectomy and sentinel lymph node biopsy for consideration of postoperative 
radiation therapy for conservative breast management. 



HISTORY OF PRESENT ILLNESS: The patient was in her usual state of health until 
routine mammography revealed a lesion present in the lower inner quadrant of her
right breast at the 4 oclock position. 



On 07/08/2020, the patient underwent core biopsies of her two suspicious areas. 
The lesion in the 4 oclock position of the right breast revealed a Grade 1 of 3
(low-grade) mucinous adenocarcinoma. The tumor was estrogen receptor strongly 
positive at 100%, progesterone receptor strongly positive at 100% and HER2 
negative. The biopsy of the lesion in the 12 oclock position was benign with 
focal fatty necrosis only. 



The patient did well and subsequently underwent a re-excision of both sites. The
lesion in the 12 oclock position was negative for malignancy. Unfortunately, 
the lesion in the 4 oclock position showed a moderately differentiated mucinous
adenocarcinoma. The tumor measured 2 cm x 1.5 cm x 1 cm. There was a ductal 
carcinoma in situ component which was moderately differentiated. All margins 
were negative for malignancy. A total of three sentinel lymph nodes were sampled
and were negative for malignancy as well. Lymphovascular invasion was not 
identified. Therefore, the patient was staged as having a pathologic T1c and 
pathologic N0 breast adenocarcinoma. 



The patient has done well since surgery and saw Dr. Quinn and has received a 
prescription for an aromatase inhibitor to be initiated following completion of 
radiation. 

 

She is now being referred to me for discussion if postoperative radiation 
therapy for conservative breast management. 



PAST MEDICAL HISTORY:  The patients past medical history is positive for a 
history of colon cancer in 2007 which was resected. She had postoperative 
chemotherapy but no radiation. In addition, the patient had an MI in 2004 for 
which she underwent cardiac bypass surgery and had stents placed. She has had 
right cataract surgery in 2007. She had left cataract surgery in 2019. She has a
history of arthritis and hypertension as well as hypothyroidism. 



ALLERGIES: The patient has no known drug allergies.



SOCIAL HISTORY:  The patient does not smoke cigarettes nor abuse alcohol.



FAMILY HISTORY:  The patients family history is positive for a sister with 
colon cancer. It is negative for any other malignancies. 



REVIEW OF SYSTEMS: The patients review of systems is noncontributory. She 
denies nausea, vomiting, fevers, chills, night sweats, diplopia, headaches, 
anxiety, depression, anorexia, weight loss, visual disturbances, chest pain, 
urinary or bowel difficulties, bone pain or neurological problems. 



PHYSICAL EXAMINATION: 

GENERAL: The patient is a well-developed, well-nourished white female in no 
acute distress. 

HEENT: Normocephalic, atraumatic. Extraocular movements are intact. 

LYMPH: There is no palpable cervical, supraclavicular, infraclavicular, axillary
or inguinal lymphadenopathy present. 

LUNGS: Clear to auscultation and percussion.

HEART: Regular rate and rhythm. 

BREASTS: No masses or discharge bilaterally. She has well-healed surgical scars 
present over her right breast consistent with her history. 

EXTREMITIES: No clubbing, cyanosis or edema. 

NEUROLOGIC: Grossly intact. 



ASSESSMENT:  I had a lengthy discussion with this patient and discussed with her
in detail the potential benefits as well as possible acute and chronic sequelae 
of external beam radiation therapy. We discussed logistics of treatment 
planning, simulation and subsequent fractionated daily radiation treatments. 



The patient still seems somewhat undecided on radiation and may wish to just do 
her aromatase inhibitors. 



The way we left it, I am scheduling her for a simulation sometime next week. If 
she decides against treatment she can cancel that simulation and this way there 
is no unnecessary delay in treatment. She is already now four weeks post surgery
and the area is well-healed. 



I discussed with her the hypo-fractionated [place] protocol and I have 
recommended three weeks of treatment to this breast. Of course, further 
recommendations can be adjusted when she meets Dr. Valverde, her treating 
physician.



She is aware at her age that it may be reasonable to withhold radiation, however
she reports that her mother was 101 years old and she does appear somewhat 
younger than her stated age. 



Thank you for allowing us to participate in the care of this very pleasant 
woman.

FAMILIA

## 2020-10-30 ENCOUNTER — HOSPITAL ENCOUNTER (OUTPATIENT)
Dept: HOSPITAL 53 - M ONCR | Age: 80
LOS: 1 days | End: 2020-10-31
Attending: RADIOLOGY
Payer: MEDICARE

## 2020-10-30 DIAGNOSIS — C50.311: Primary | ICD-10-CM

## 2020-11-04 ENCOUNTER — HOSPITAL ENCOUNTER (OUTPATIENT)
Dept: HOSPITAL 53 - M ONCR | Age: 80
LOS: 26 days | End: 2020-11-30
Attending: RADIOLOGY
Payer: MEDICARE

## 2020-11-04 DIAGNOSIS — C50.311: Primary | ICD-10-CM

## 2021-02-09 ENCOUNTER — HOSPITAL ENCOUNTER (INPATIENT)
Dept: HOSPITAL 53 - M PM&R | Age: 81
LOS: 7 days | Discharge: HOME | DRG: 57 | End: 2021-02-16
Attending: PHYSICAL MEDICINE & REHABILITATION | Admitting: PHYSICAL MEDICINE & REHABILITATION
Payer: MEDICARE

## 2021-02-09 VITALS — DIASTOLIC BLOOD PRESSURE: 70 MMHG | SYSTOLIC BLOOD PRESSURE: 152 MMHG

## 2021-02-09 VITALS — WEIGHT: 181 LBS | HEIGHT: 65 IN | BODY MASS INDEX: 30.16 KG/M2

## 2021-02-09 VITALS — SYSTOLIC BLOOD PRESSURE: 129 MMHG | DIASTOLIC BLOOD PRESSURE: 79 MMHG

## 2021-02-09 DIAGNOSIS — Z85.3: ICD-10-CM

## 2021-02-09 DIAGNOSIS — I69.322: ICD-10-CM

## 2021-02-09 DIAGNOSIS — Z74.1: ICD-10-CM

## 2021-02-09 DIAGNOSIS — Z74.09: ICD-10-CM

## 2021-02-09 DIAGNOSIS — I82.612: ICD-10-CM

## 2021-02-09 DIAGNOSIS — I69.320: ICD-10-CM

## 2021-02-09 DIAGNOSIS — Z85.038: ICD-10-CM

## 2021-02-09 DIAGNOSIS — R53.1: ICD-10-CM

## 2021-02-09 DIAGNOSIS — I10: ICD-10-CM

## 2021-02-09 DIAGNOSIS — G62.9: ICD-10-CM

## 2021-02-09 DIAGNOSIS — Z92.3: ICD-10-CM

## 2021-02-09 DIAGNOSIS — Z90.49: ICD-10-CM

## 2021-02-09 DIAGNOSIS — R13.10: ICD-10-CM

## 2021-02-09 DIAGNOSIS — E03.9: ICD-10-CM

## 2021-02-09 DIAGNOSIS — I25.10: ICD-10-CM

## 2021-02-09 DIAGNOSIS — Z95.1: ICD-10-CM

## 2021-02-09 DIAGNOSIS — E78.5: ICD-10-CM

## 2021-02-09 DIAGNOSIS — Z98.41: ICD-10-CM

## 2021-02-09 DIAGNOSIS — Z79.82: ICD-10-CM

## 2021-02-09 DIAGNOSIS — I69.391: Primary | ICD-10-CM

## 2021-02-09 DIAGNOSIS — I69.392: ICD-10-CM

## 2021-02-09 DIAGNOSIS — Z79.899: ICD-10-CM

## 2021-02-09 RX ADMIN — SIMVASTATIN SCH MG: 40 TABLET, FILM COATED ORAL at 20:44

## 2021-02-09 RX ADMIN — SENNOSIDES SCH TAB: 8.6 TABLET, FILM COATED ORAL at 20:45

## 2021-02-09 RX ADMIN — WHITE PETROLATUM SCH DOSE: 57; 17 PASTE TOPICAL at 20:45

## 2021-02-09 RX ADMIN — DOCUSATE SODIUM SCH MG: 100 CAPSULE, LIQUID FILLED ORAL at 20:45

## 2021-02-10 VITALS — DIASTOLIC BLOOD PRESSURE: 76 MMHG | SYSTOLIC BLOOD PRESSURE: 159 MMHG

## 2021-02-10 VITALS — SYSTOLIC BLOOD PRESSURE: 149 MMHG | DIASTOLIC BLOOD PRESSURE: 78 MMHG

## 2021-02-10 VITALS — SYSTOLIC BLOOD PRESSURE: 124 MMHG | DIASTOLIC BLOOD PRESSURE: 65 MMHG

## 2021-02-10 LAB
ALBUMIN SERPL BCG-MCNC: 2.9 GM/DL (ref 3.2–5.2)
ALT SERPL W P-5'-P-CCNC: 14 U/L (ref 12–78)
BASOPHILS # BLD AUTO: 0 10^3/UL (ref 0–0.2)
BASOPHILS NFR BLD AUTO: 0.6 % (ref 0–1)
BILIRUB SERPL-MCNC: 0.4 MG/DL (ref 0.2–1)
BUN SERPL-MCNC: 19 MG/DL (ref 7–18)
CALCIUM SERPL-MCNC: 7.8 MG/DL (ref 8.8–10.2)
CHLORIDE SERPL-SCNC: 114 MEQ/L (ref 98–107)
CO2 SERPL-SCNC: 21 MEQ/L (ref 21–32)
CREAT SERPL-MCNC: 0.95 MG/DL (ref 0.55–1.3)
EOSINOPHIL # BLD AUTO: 0.4 10^3/UL (ref 0–0.5)
EOSINOPHIL NFR BLD AUTO: 5.1 % (ref 0–3)
GFR SERPL CREATININE-BSD FRML MDRD: > 60 ML/MIN/{1.73_M2} (ref 32–?)
GLUCOSE SERPL-MCNC: 93 MG/DL (ref 70–100)
HCT VFR BLD AUTO: 36.6 % (ref 36–47)
HGB BLD-MCNC: 12.1 G/DL (ref 12–15.5)
LYMPHOCYTES # BLD AUTO: 2.2 10^3/UL (ref 1.5–5)
LYMPHOCYTES NFR BLD AUTO: 30.3 % (ref 24–44)
MCH RBC QN AUTO: 33.3 PG (ref 27–33)
MCHC RBC AUTO-ENTMCNC: 33.1 G/DL (ref 32–36.5)
MCV RBC AUTO: 100.8 FL (ref 80–96)
MONOCYTES # BLD AUTO: 0.5 10^3/UL (ref 0–0.8)
MONOCYTES NFR BLD AUTO: 6.4 % (ref 0–5)
NEUTROPHILS # BLD AUTO: 4.1 10^3/UL (ref 1.5–8.5)
NEUTROPHILS NFR BLD AUTO: 57.3 % (ref 36–66)
PLATELET # BLD AUTO: 135 10^3/UL (ref 150–450)
POTASSIUM SERPL-SCNC: 3.7 MEQ/L (ref 3.5–5.1)
PROT SERPL-MCNC: 6.3 GM/DL (ref 6.4–8.2)
RBC # BLD AUTO: 3.63 10^6/UL (ref 4–5.4)
SODIUM SERPL-SCNC: 144 MEQ/L (ref 136–145)
WBC # BLD AUTO: 7.2 10^3/UL (ref 4–10)

## 2021-02-10 RX ADMIN — LEVOTHYROXINE SODIUM SCH MCG: 88 TABLET ORAL at 05:43

## 2021-02-10 RX ADMIN — WHITE PETROLATUM SCH DOSE: 57; 17 PASTE TOPICAL at 08:48

## 2021-02-10 RX ADMIN — SODIUM CHLORIDE SCH UNITS: 4.5 INJECTION, SOLUTION INTRAVENOUS at 08:48

## 2021-02-10 RX ADMIN — SIMVASTATIN SCH MG: 40 TABLET, FILM COATED ORAL at 20:14

## 2021-02-10 RX ADMIN — WHITE PETROLATUM SCH DOSE: 57; 17 PASTE TOPICAL at 16:00

## 2021-02-10 RX ADMIN — CALCIUM SCH MG: 500 TABLET ORAL at 08:48

## 2021-02-10 RX ADMIN — MULTIPLE VITAMINS W/ MINERALS TAB SCH TAB: TAB at 08:47

## 2021-02-10 RX ADMIN — Medication SCH UNITS: at 08:47

## 2021-02-10 RX ADMIN — SODIUM CHLORIDE SCH UNITS: 4.5 INJECTION, SOLUTION INTRAVENOUS at 20:14

## 2021-02-10 RX ADMIN — WHITE PETROLATUM SCH DOSE: 57; 17 PASTE TOPICAL at 20:15

## 2021-02-10 RX ADMIN — SENNOSIDES SCH TAB: 8.6 TABLET, FILM COATED ORAL at 20:14

## 2021-02-10 RX ADMIN — LISINOPRIL SCH MG: 2.5 TABLET ORAL at 08:47

## 2021-02-10 RX ADMIN — DOCUSATE SODIUM SCH MG: 100 CAPSULE, LIQUID FILLED ORAL at 20:14

## 2021-02-10 RX ADMIN — PANTOPRAZOLE SODIUM SCH MG: 40 TABLET, DELAYED RELEASE ORAL at 08:48

## 2021-02-10 RX ADMIN — DOCUSATE SODIUM SCH MG: 100 CAPSULE, LIQUID FILLED ORAL at 08:48

## 2021-02-10 RX ADMIN — ASPIRIN SCH MG: 81 TABLET ORAL at 08:48

## 2021-02-10 NOTE — HPEPDOC
Physiatrist Note


DATE OF ADMISSION: 2-9-21





DATE OF SERVICE: 2-10-21





TIME OF ADMISSION: Please refer to physician's admission order.





SOURCE OF ADMISSION INFORMATION: [facility] record and patient





CHIEF COMPLAINT: stroke





HISTORY OF PRESENT ILLNESS: 


80F pmh HLD, HTN, CAD s/p CABG, hypothyroidism, neuropathy, breast cancer s/p 

radiation, colon cancer s/p hemicolectomy presented to [facility] ED on 2-6-21

with dysarthria. CTH was negative for acute hemorrhage and CTA head and neck 

showed, Multiple hypoattenuating lesions within bilateral subinsular region, 

right basal ganglia and right frontal subcortical white matter and left frontal 

regions which are most consistent with subacute to chronic lacunar infarctsNo 

occlusion or significant stenosis in the arteries of the neck. Patient received

TPA, follow-up MRI showed "Acute lacunar infarcts in the left middle frontal 

lobe at the level of the centrum semiovale...old lacunar infarcts in the basal 

ganglia bilaterally." ABUNDIO with bubble study showed a patent foramen ovale with 

grade 1 right to left shunt. Doppler Venous study 2-9-21 was negative for DVTs 

and she was evaluated by cardiology who placed a loop recorder for cryptogenic 

stroke on 2-8-21. She was maintained on aspirin and stating for secondary strok

e prevention, found to have mobility and ADLs impairments, placed on a dysphagia

diet, and deemed medically appropriate for discharge to ARU on 2-9-21.














REVIEW OF SYSTEMS: The following is a completed review of systems and has been 

reviewed. Review of systems otherwise unremarkable.


PAIN: Patient self reports no pain


EYES: No recent vision changes


EARS, NOSE, & THROAT: +dysphagia


CARDIOVASCULAR: denies chest pain or palpitations


PULMONARY: Denies shortness of breath


GASTROINTESTINAL: Denies constipation/diarrhea


GENITOURINARY: denies dysuria


MUSCULOSKELETAL: generalized weakenss


NEUROLOGICAL:dysphagia and mild aphasia.


HEMATOLOGICAL:denies easy bruising


SKIN: no rash 


PSYCHIATRIC: Unremarkable


All other review of systems found to be negative.





PAST MEDICAL HISTORY:


as per HPI





PAST SURGICAL HISTORY:


As per HPI and cholecystectomy, bilat cataract surgery, retinal separation 

surgery





ALLERGIES: Please see below.





MEDICATIONS: Please see below.





FAMILY HISTORY: Cardiac, colon cancer





SOCIAL HISTORY: No etoh/illicit drugs/smoking





DIET:  level 2 and nectar 





PHYSICAL EXAMINATION:


VITAL SIGNS: Please see below.


GENERAL: Pleasant and cooperative. No acute distress. 


HEENT: PERRL. Extraocular movements intact. Clear conjunctiva, slight right 

sided facial droop


CARDIOVASCULAR: Regular rate and rhythm. No murmurs, rubs, or gallops


LUNGS: Clear to auscultation bilaterally. No wheezes. No rhonchi


ABDOMEN: Soft, nontender, nondistended. Positive bowel sounds. Normal active 

bowel sounds


NEUROLOGICAL: Alert and oriented times three. Cranial nerves II through XII 

grossly intact. Sensation grossly intact


-no dysmetria, mild right sided pronator drift, +mild expressive aphasia, visual

fields intact 


-no clonus/shah's





EXTREMITIES: 5\5 strength bilateral upper extremities. 5\5 strength right knee 

extension/flexion/ ankle DF/PF (hip flexion 4/5 chronic), 5/5 strength in left 

lower extremity. 





SKIN: chest wall loop recorder incision





LABORATORY DATA: Please see below.





IMAGING: Imaging documentation personally reviewed by record





FUNCTIONAL STATUS: 


Premorbid: Independent with all activities of daily life as well as mobility


On Admission:  requiring assistance with ambulation, bed mobility, functional 

transfers, dressing


GOALS: Mod-I for ambulation community distances, stairs, functional transfers, 

bathing, toileting





ASSESSMENT:80-year-old F with past medical history of HTN who presents status 

post stroke





PLAN:


1.Rehab- PT/OT advance mobility and ADLs, strengthen/stretch/maintain ROM all 4l

limbs


-SLP- c/u therapy for dysphagia


2. Neuro- s/p acute lacunar infarcts in the left middle frontal lobe at the 

level of the centrum semiovale, c/u ASA and statin for secondary stroke 

prevention-


LOOP recorder in place to check for Afib


-+PFO on recent ABUNDIO, f/u with cardiology at [facility]- LE Dopplers negative for DVT p

erformed at [facility] 2-9-21  


3. CArdiac- hx of CAD with CABG, c/u ASA and lisinopril


-HLD- c/u zocor


-medicine consulted to assist in overall management


4. Endo- hx of hypothyroidism c/u synthroid


5. GI ppx-protonix


6. DVT ppx - teds and heparin


7. pain- tylenol prn


8. Dispo- tbd








POST ADMISSION PHYSICIAN EVALUATION: 


Medical and functional status: Description of medical status, medical 

assessment: As above. Rehabilitation diagnosis and current and prior cold morbid

medical conditions as above. Risk of complications and plans to mitigate them as

above. Description of functional status current status is as above. Prior status

as above.


Status compared to preadmission: There are no clinically significant differences

between the patient's current status and the information described on the 

preadmission screening document.


Treatment plan anticipated: Treatment plan is as described above. Required 

disciplines including physical therapy, occupational therapy, others as noted 

above


Intensity of services: 3 hours a day, 6 days a week. 


Special considerations: There are no specific special or safety considerations 

that would likely preclude immediate implementation of an intensive 

rehabilitation program or subsequently influence the plan of care.





ATTESTATION: Considering all the information above, it is my best judgment that 

this patient requires intensive rehabilitation therapy as described above and an

inpatient hospital environment due to the complexity of nursing, medical, and 

rehabilitation needs required by the patient. Furthermore, this patient can marcelo

sonably be expected to participate in an benefit from an inpatient 

rehabilitation stay with an interdisciplinary team approach to the delivery of 

rehabilitation care under the direction and supervision of rehabilitation 

physician.





PROGNOSIS: Excellent





ESTIMATED LENGTH OF STAY:7-10 days.





PROJECTED DISCHARGE DESTINATION: Home with family support and any durable 

medical equipment required to increase functional safety and mobility.





TIME SPENT COUNSELING AND COORDINATING INITIAL CARE: Greater than 70 minutes.


Vital Signs





Vital Sign - Last 24 Hours








 2/9/21 2/9/21 2/10/21 2/10/21





 18:05 20:00 05:27 08:47


 


Temp 97.9 97.7 98.5 


 


Pulse 94 76 75 


 


Resp 18 18 18 


 


B/P (MAP) 129/79 (96) 152/70 (97) 149/78 (101) 149/78


 


Pulse Ox 99 97 95 


 


O2 Delivery Room Air Room Air Room Air 











Laboratory Data


CBC/BMP


Laboratory Tests


2/10/21 06:28








Labs 24H


Laboratory Tests 2


2/10/21 06:28: 


Immature Granulocyte % (Auto) 0.3, Neutrophils (%) (Auto) 57.3, Lymphocytes (%) 

(Auto) 30.3, Monocytes (%) (Auto) 6.4H, Eosinophils (%) (Auto) 5.1H, Basophils 

(%) (Auto) 0.6, Neutrophils # (Auto) 4.1, Lymphocytes # (Auto) 2.2, Monocytes # 

(Auto) 0.5, Eosinophils # (Auto) 0.4, Basophils # (Auto) 0.0, Nucleated Red 

Blood Cells % (auto) 0.0, Anion Gap 9, Glomerular Filtration Rate > 60.0, 

Calcium Level 7.8L, Total Bilirubin 0.4, Aspartate Amino Transf (AST/SGOT) 14, 

Alanine Aminotransferase (ALT/SGPT) 14, Alkaline Phosphatase 48, Total Protein 

6.3L, Albumin 2.9L, Albumin/Globulin Ratio 0.9L





Home Medications


Scheduled


Anastrozole (Anastrozole) 1 Mg Tablet, 1 MG PO DAILY, (Reported)


Aspirin (Aspirin) 81 Mg Tab.chew, 81 MG PO DAILY, (Reported)


Cholecalciferol (Vitamin D3) (Vitamin D3) 25 Mcg Capsule, 25 MCG PO DAILY, 

(Reported)


Enoxaparin Sodium (Enoxaparin Sodium) 40 Mg/0.4 Ml Syringe, 40 MG SC DAILY, 

(Reported)


Levothyroxine Sodium (Levothyroxine Sodium) 88 Mcg Tab, 88 MCG PO DAILY, 

(Reported)


Lidocaine (Lidocaine) 5% Adh..patch, 1 PATCH TOP DAILY, (Reported)


   APPLY TO BACK 


Lisinopril (Lisinopril) 5 Mg Tablet, 5 MG PO DAILY, (Reported)


Multivitamin (Multivitamin) 1 Each Tablet, 1 EACH PO DAILY, (Reported)


Nitroglycerin (Nitroglycerin) 0.4 Mg Sub, 0.4 MG SL ASDIRECTED, (Reported)


   1st sign of attack; may repeat every 5 mins; if pain persists after 3 in 15 

min, medical attention is recommended 


Pantoprazole Sodium (Pantoprazole Sodium) 40 Mg Tablet.dr, 40 MG PO DAILY, 

(Reported)


Simvastatin (Simvastatin) 40 Mg Tab, 40 MG PO DAILY, (Reported)





Scheduled PRN


Acetaminophen (Tylenol) 325 Mg Tablet, 650 MG PO Q4-6HP PRN for PAIN OR FEVER, 

(Reported)





Allergies


Coded Allergies:  


     No Known Allergies (Unverified , 11/13/18)





A-FIB/CHADSVASC


A-FIB History


Current/History of A-Fib/PAF?:  No


Current PO Anticoag Therapy:  No











ART HAYNES MD         Feb 10, 2021 13:13

## 2021-02-10 NOTE — HPEPDOC
[facility] Medical History & Physical


Date of Admission


Feb 9, 2021


Date of Service:  Feb 10, 2021


Attending Physician:  Di Vo MD





History and Physical


MEDICAL CONSULT H&P





HISTORY OF PRESENT ILLNESS: 


Patient is an 80-year-old female with past medical history breast cancer status 

post radiation, colon cancer status post hemicolectomy in remission, 

hypertension, hyperlipidemia, CAD status post CABG, GERD, arthritis, anemia, 

hypothyroidism who was transferred to ARU from  after

having acute ischemic stroke of the left MCA, left frontal region. Etiology was 

suspected to be embolic, suspecting cardioembolic. She is post TPA. The patient 

had a extensive workup done at [facility] including ABUNDIO 

(results are pending), CT angiography of the brain, MRI of the brain. She was 

assessed by physical and occupational therapy along with speech therapy who west

ggested continued rehabilitation status post stroke. The patient was sent to the

ARU at [facility] on 02/09/2021 to continue rehabilitation. 





Upon evaluation, the patient had no acute complaints. She states she feels as 

though her right-sided weakness is improving. She had chronic right lower 

extremity weakness status post CABG in 2000 for which she states is at baseline.

Vital signs and labs were unremarkable. Medicine was consulted to continue to 

follow while she is in the rehabilitation unit.





REVIEW OF SYSTEMS: 


CONSTITUTIONAL: Denies lack of energy, unexplained weight gain or weight loss, 

loss of appetite, fever, night sweats


EYES: Denies eye drainage, eye pain, visual changes, dry/irritated eye


EARS, NOSE, MOUTH, THROAT: Denies difficulty hearing, ringing in ears, mouth 

sores, loose teeth, sore throat, facial numbness or pain


NECK: Denies swollen glands 


CARDIOVASCULAR: Denies irregular heartbeat, racing heart, chest pains, swelling 

of feet or legs, pain in legs with walking


RESPIRATORY: Denies shortness of breath, night sweats, wheezing, sputum 

production, oxygen at home, coughing up blood, cough lasting > 1 month 


GASTROINTESTINAL: Denies abdominal pain, constipation, bloody stool, diarrhea, 

heartburn, nausea, vomiting


GENITOURINARY: Denies painful urination, bloody urine, frequent urination, 

urgency, leaking urine, impotence


MUSCULOSKELETAL: Denies joint pain, muscle pain, leg swelling 


INTEGUMENTARY: Denies rash, itching, new skin lesion, change in existing skin 

lesion, hair loss or increase, breast changes. 


NEUROLOGICAL: Denies headaches, dizziness, numbness or tingling


PSYCHIATRIC: Denies depression, anxiety, recurrent bad thoughts, mood swings, 

hallucinations





PAST MEDICAL HISTORY: 


Acute left MCA, left frontal region ischemic stroke possibly cardioembolic


CAD status post CABG


Hypertension


Low-grade mucinous adenocarcinoma right breast, status post lumpectomy and 

radiation therapy


History of colon cancer status post hemicolectomy


Hyperlipidemia


Arthritis


Hypothyroidism





PAST SURGICAL HISTORY: 


Breast surgery


Surgery


Cholecystectomy 


Colonoscopy 


Coronary artery bypass graft


Eye surgery


Colectomy





FAMILY HISTORY: 


Father: Heart disease


Mother: Cancer





SOCIAL HISTORY: 


Agent is . She denies smoking, alcohol or drug use history. She lives in 

the local area. She is a full code. 





ALLERGIES: 


Please see below. 





HOME MEDICATIONS: 


Please see below.





PHYSICAL EXAMINATION: 


VS: Please see below 


CONSTITUTIONAL: No acute distress, resting comfortably sitting at bedside chair 

, AAO x 3


EYES: PERRLA, EOM intact


HENT, MOUTH: Right side facial droop,  moist mucous membranes


NECK: SUPPLE, no JVD, no lymphadenopathy, no carotid bruit


CV: Regular rate and rhythm, S1S2 normal, no murmurs/rubs/gallops


RESPIRATORY: Clear to auscultation bilaterally, no rales/rhonchi/wheezes


GI: obese abd, BS positive in 4 quadrants, soft, nontender, nondistended, no 

rebound or guarding, no organomegaly


: Deferred


MUSCULOSKELETAL: Normal ROM. No cyanosis, clubbing, swelling, joint deformity, 

extremity edema


INTEGUMENTARY:  Intact, no rashes, no lesions, no erythema


NEUROLOGIC: Strength 5/5 in all upper ext, in LLE. 4/5 strength in LLE (

[place]). Right side facial droop, no tongue deviation, reflexes intact in all 

extremities 


PSYCHIATRIC: Mood and affect are normal 





LABORATORY DATA: 


Please see below 





IMAGING: 


No new imaging since admission 





ASSESSMENT: Patient is an 80-year-old female with past medical history breast 

cancer status post radiation, colon cancer status post hemicolectomy in 

remission, hypertension, hyperlipidemia, CAD status post CABG, GERD, arthritis, 

anemia, hypothyroidism who was transferred to ARU from  after having acute ischemic stroke of the left MCA, left frontal 

region. 





DIAGNOSES: 


Acute left MCA, left frontal region ischemic stroke possibly cardioembolic


CAD status post CABG


Hypertension


Low-grade mucinous adenocarcinoma right breast, status post lumpectomy and 

radiation therapy


History of colon cancer status post hemicolectomy


Hyperlipidemia


Arthritis


Hypothyroidism





PLAN: 


C/w current PT/OT schedule and active medications. Chronic issues appear stable.

Goal is home after ARU. 





DISPOSITION: Thank you kindly for this consult. We will continue to follow 

closely while here.





Vital Signs





Vital Signs








  Date Time  Temp Pulse Resp B/P (MAP) Pulse Ox O2 Delivery O2 Flow Rate FiO2


 


2/10/21 08:47    149/78    


 


2/10/21 05:27 98.5 75 18  95 Room Air  











Laboratory Data


Labs 24H


Laboratory Tests 2


2/10/21 06:28: 


Immature Granulocyte % (Auto) 0.3, Neutrophils (%) (Auto) 57.3, Lymphocytes (%) 

(Auto) 30.3, Monocytes (%) (Auto) 6.4H, Eosinophils (%) (Auto) 5.1H, Basophils 

(%) (Auto) 0.6, Neutrophils # (Auto) 4.1, Lymphocytes # (Auto) 2.2, Monocytes # 

(Auto) 0.5, Eosinophils # (Auto) 0.4, Basophils # (Auto) 0.0, Nucleated Red 

Blood Cells % (auto) 0.0, Anion Gap 9, Glomerular Filtration Rate > 60.0, 

Calcium Level 7.8L, Total Bilirubin 0.4, Aspartate Amino Transf (AST/SGOT) 14, 

Alanine Aminotransferase (ALT/SGPT) 14, Alkaline Phosphatase 48, Total Protein 

6.3L, Albumin 2.9L, Albumin/Globulin Ratio 0.9L


CBC/BMP


Laboratory Tests


2/10/21 06:28











Home Medications


Scheduled


Anastrozole (Anastrozole) 1 Mg Tablet, 1 MG PO DAILY


Aspirin (Aspirin) 81 Mg Tab.chew, 81 MG PO DAILY


Cholecalciferol (Vitamin D3) (Vitamin D3) 25 Mcg Capsule, 25 MCG PO DAILY


Enoxaparin Sodium (Enoxaparin Sodium) 40 Mg/0.4 Ml Syringe, 40 MG SC DAILY


Levothyroxine Sodium (Levothyroxine Sodium) 88 Mcg Tab, 88 MCG PO DAILY


Lidocaine (Lidocaine) 5% Adh..patch, 1 PATCH TOP DAILY


   APPLY TO BACK 


Lisinopril (Lisinopril) 5 Mg Tablet, 5 MG PO DAILY


Multivitamin (Multivitamin) 1 Each Tablet, 1 EACH PO DAILY


Nitroglycerin (Nitroglycerin) 0.4 Mg Sub, 0.4 MG SL ASDIRECTED for chest pain


   1st sign of attack; may repeat every 5 mins; if pain persists after 3 in 15 

min, medical attention is recommended 


Pantoprazole Sodium (Pantoprazole Sodium) 40 Mg Tablet.dr, 40 MG PO DAILY


Simvastatin (Simvastatin) 40 Mg Tab, 40 MG PO DAILY





Scheduled PRN


Acetaminophen (Tylenol) 325 Mg Tablet, 650 MG PO Q4-6HP PRN for PAIN OR FEVER





Allergies


Coded Allergies:  


     No Known Allergies (Unverified , 11/13/18)





A-FIB/CHADSVASC


A-FIB History


Current/History of A-Fib/PAF?:  No


Current PO Anticoag Therapy:  No





Age/Risk Factor Scoring


CHADSVASC:  








CHADSVASC Response (Comments) Value


 


Age Risk Factor Age >/= 75 years old 2


 


Gender Risk Factor Female 1


 


Hx of CHF No 0


 


Hx of HTN Yes 1


 


Hx of Stroke/TIA/or VTE Yes 2


 


Hx of Diabetes No 0


 


Hx of Vascular Disease No 0


 


Total  6











Treatment


Treatment ordered:  Other


Other anticoagulant ordered:  heparin











Di Vo MD            Feb 10, 2021 12:46

## 2021-02-11 VITALS — SYSTOLIC BLOOD PRESSURE: 137 MMHG | DIASTOLIC BLOOD PRESSURE: 65 MMHG

## 2021-02-11 VITALS — SYSTOLIC BLOOD PRESSURE: 155 MMHG | DIASTOLIC BLOOD PRESSURE: 80 MMHG

## 2021-02-11 VITALS — SYSTOLIC BLOOD PRESSURE: 115 MMHG | DIASTOLIC BLOOD PRESSURE: 56 MMHG

## 2021-02-11 VITALS — DIASTOLIC BLOOD PRESSURE: 65 MMHG | SYSTOLIC BLOOD PRESSURE: 118 MMHG

## 2021-02-11 RX ADMIN — SIMVASTATIN SCH MG: 40 TABLET, FILM COATED ORAL at 20:06

## 2021-02-11 RX ADMIN — SENNOSIDES SCH TAB: 8.6 TABLET, FILM COATED ORAL at 20:06

## 2021-02-11 RX ADMIN — Medication SCH UNITS: at 09:04

## 2021-02-11 RX ADMIN — PANTOPRAZOLE SODIUM SCH MG: 40 TABLET, DELAYED RELEASE ORAL at 09:04

## 2021-02-11 RX ADMIN — MULTIPLE VITAMINS W/ MINERALS TAB SCH TAB: TAB at 09:04

## 2021-02-11 RX ADMIN — WHITE PETROLATUM SCH DOSE: 57; 17 PASTE TOPICAL at 09:00

## 2021-02-11 RX ADMIN — WHITE PETROLATUM SCH DOSE: 57; 17 PASTE TOPICAL at 20:06

## 2021-02-11 RX ADMIN — CALCIUM SCH MG: 500 TABLET ORAL at 09:04

## 2021-02-11 RX ADMIN — SODIUM CHLORIDE SCH UNITS: 4.5 INJECTION, SOLUTION INTRAVENOUS at 20:06

## 2021-02-11 RX ADMIN — SODIUM CHLORIDE SCH UNITS: 4.5 INJECTION, SOLUTION INTRAVENOUS at 09:05

## 2021-02-11 RX ADMIN — LEVOTHYROXINE SODIUM SCH MCG: 88 TABLET ORAL at 05:45

## 2021-02-11 RX ADMIN — WHITE PETROLATUM SCH DOSE: 57; 17 PASTE TOPICAL at 16:00

## 2021-02-11 RX ADMIN — DOCUSATE SODIUM SCH MG: 100 CAPSULE, LIQUID FILLED ORAL at 09:04

## 2021-02-11 RX ADMIN — ASPIRIN SCH MG: 81 TABLET ORAL at 09:04

## 2021-02-11 RX ADMIN — DOCUSATE SODIUM SCH MG: 100 CAPSULE, LIQUID FILLED ORAL at 20:06

## 2021-02-11 RX ADMIN — METOPROLOL TARTRATE SCH MG: 25 TABLET, FILM COATED ORAL at 20:06

## 2021-02-11 RX ADMIN — LISINOPRIL SCH MG: 2.5 TABLET ORAL at 09:05

## 2021-02-11 RX ADMIN — METOPROLOL TARTRATE SCH MG: 25 TABLET, FILM COATED ORAL at 11:07

## 2021-02-11 NOTE — IPNPDOC
PM&R Progress Note


DATE OF SERVICE:  Feb 11, 2021


Physiatrist Progress Note


Subjective:





Patient stating she feels well and is getting more steady on her feet. She 

denies worsening swelling in her legs.








REVIEW OF SYSTEMS: The following is a completed review of systems and has been 

reviewed. Review of systems otherwise unremarkable.


PAIN: Patient self reports no pain


EYES: No recent vision changes


EARS, NOSE, & THROAT: +dysphagia


CARDIOVASCULAR: denies chest pain or palpitations


PULMONARY: Denies shortness of breath


GASTROINTESTINAL: Denies constipation/diarrhea


GENITOURINARY: denies dysuria


MUSCULOSKELETAL: generalized weakness


NEUROLOGICAL:dysphagia and mild aphasia.


HEMATOLOGICAL:denies easy bruising


SKIN: no rash 


PSYCHIATRIC: Unremarkable


All other review of systems found to be negative.











PHYSICAL EXAMINATION:


VITAL SIGNS: Please see below.


GENERAL: Pleasant and cooperative. No acute distress. 


HEENT: PERRL. Extraocular movements intact. Clear conjunctiva, slight right 

sided facial droop


CARDIOVASCULAR: Regular rate and rhythm. No murmurs, rubs, or gallops


LUNGS: Clear to auscultation bilaterally. No wheezes. No rhonchi


ABDOMEN: Soft, nontender, nondistended. Positive bowel sounds. Normal active b

owel sounds


NEUROLOGICAL: Alert and oriented times three. Cranial nerves II through XII 

grossly intact. Sensation grossly intact


-no dysmetria, mild right sided pronator drift, +mild expressive aphasia, visual

fields intact 


-no clonus/shah's





EXTREMITIES: 5\5 strength bilateral upper extremities. 5\5 strength right knee 

extension/flexion/ ankle DF/PF (hip flexion 4/5 chronic), 5/5 strength in left 

lower extremity. 





SKIN: chest wall loop recorder incision











ASSESSMENT:80-year-old F with past medical history of HTN who presents status 

post stroke





PLAN:


1.Rehab- PT/OT advance mobility and ADLs, strengthen/stretch/maintain ROM all 4l

limbs, ambulating further distances


-SLP- c/u therapy for dysphagia


2. Neuro- s/p acute lacunar infarcts in the left middle frontal lobe at the 

level of the centrum semiovale, c/u ASA and statin for secondary stroke 

prevention-


LOOP recorder in place to check for Afib


-+PFO on recent ABUNDIO, f/u with cardiology at [facility]- LE Dopplers negative for DVT 

performed at [facility] 2-9-21  


3. CArdiac- hx of CAD with CABG, c/u ASA and lisinopril


-HLD- c/u zocor


-medicine consulted to assist in overall management


4. Endo- hx of hypothyroidism c/u synthroid


5. GI ppx-protonix


6. DVT ppx - teds and heparin


7. pain- tylenol prn


8. Dispo- tbd


Allergies


Coded Allergies:  


     No Known Allergies (Unverified , 11/13/18)





Vital Signs





Vital Signs








  Date Time  Temp Pulse Resp B/P (MAP) Pulse Ox O2 Delivery O2 Flow Rate FiO2


 


2/11/21 06:00 98.2 67 18 155/80 (105) 97 Room Air  











Current Medications


Current Medications





Current Medications








 Medications


  (Trade)  Dose


 Ordered  Sig/Supriya


 Route


 PRN Reason  Start Time


 Stop Time Status Last Admin


Dose Admin


 


 Acetaminophen


  (Tylenol Tab)  650 mg  Q4HP  PRN


 PO


 fever/MILD PAIN (PS 1-4)  2/9/21 14:00


     





 


 Aspirin


  (Ecotrin)  81 mg  DAILY


 PO


   2/10/21 09:00


    2/11/21 09:04





 


 Bisacodyl


  (Dulcolax


 Suppository)  10 mg  DAILYPRN  PRN


 SC


 CONSTIPATION  2/9/21 14:00


     





 


 Bisacodyl


  (Dulcolax Tab)  5 mg  DAILYPRN  PRN


 PO


 CONSTIPATION  2/9/21 14:00


     





 


 Calcium Carbonate


  (Oscal)  500 mg  DAILY


 PO


   2/10/21 09:00


    2/11/21 09:04





 


 Docusate Sodium


  (Colace)  100 mg  BID


 PO


   2/9/21 21:00


    2/11/21 09:04





 


 Heparin Sodium


  (Porcine)


  (Heparin)  5,000 units  Q12H


 SC


   2/10/21 09:00


    2/11/21 09:05





 


 Home Med


  (Med Rec


 Complete!)    ASDIRECTED


 XX


   2/9/21 18:45


 2/9/21 18:54 DC  





 


 Levothyroxine


 Sodium


  (Synthroid)  88 mcg  DAILY@06


 PO


   2/10/21 06:00


    2/11/21 05:45





 


 Lisinopril


  (Prinivil)  2.5 mg  DAILY


 PO


   2/10/21 09:00


    2/11/21 09:05





 


 Multivitamins


  (Theragram-M)  1 tab  DAILY


 PO


   2/10/21 09:00


    2/11/21 09:04





 


 Pantoprazole


 Sodium


  (Protonix)  40 mg  DAILY


 PO


   2/10/21 09:00


    2/11/21 09:04





 


 Senna


  (Senokot)  1 tab  QHS


 PO


   2/9/21 21:00


    2/10/21 20:14





 


 Simvastatin


  (Zocor)  40 mg  QHS


 PO


   2/9/21 21:00


    2/10/21 20:14





 


 Vitamin D


  (Vitamin D)  1,000 units  DAILY


 PO


   2/10/21 09:00


    2/11/21 09:04




















ART HAYNES MD         Feb 11, 2021 10:05

## 2021-02-12 VITALS — DIASTOLIC BLOOD PRESSURE: 81 MMHG | SYSTOLIC BLOOD PRESSURE: 155 MMHG

## 2021-02-12 VITALS — SYSTOLIC BLOOD PRESSURE: 143 MMHG | DIASTOLIC BLOOD PRESSURE: 70 MMHG

## 2021-02-12 VITALS — SYSTOLIC BLOOD PRESSURE: 160 MMHG | DIASTOLIC BLOOD PRESSURE: 77 MMHG

## 2021-02-12 LAB
BASOPHILS # BLD AUTO: 0 10^3/UL (ref 0–0.2)
BASOPHILS NFR BLD AUTO: 0.7 % (ref 0–1)
BUN SERPL-MCNC: 23 MG/DL (ref 7–18)
CALCIUM SERPL-MCNC: 7.9 MG/DL (ref 8.8–10.2)
CHLORIDE SERPL-SCNC: 115 MEQ/L (ref 98–107)
CO2 SERPL-SCNC: 23 MEQ/L (ref 21–32)
CREAT SERPL-MCNC: 1.03 MG/DL (ref 0.55–1.3)
EOSINOPHIL # BLD AUTO: 0.4 10^3/UL (ref 0–0.5)
EOSINOPHIL NFR BLD AUTO: 6.3 % (ref 0–3)
GFR SERPL CREATININE-BSD FRML MDRD: 54.9 ML/MIN/{1.73_M2} (ref 32–?)
GLUCOSE SERPL-MCNC: 89 MG/DL (ref 70–100)
HCT VFR BLD AUTO: 35 % (ref 36–47)
HGB BLD-MCNC: 11 G/DL (ref 12–15.5)
LYMPHOCYTES # BLD AUTO: 2.5 10^3/UL (ref 1.5–5)
LYMPHOCYTES NFR BLD AUTO: 43.9 % (ref 24–44)
MCH RBC QN AUTO: 32.1 PG (ref 27–33)
MCHC RBC AUTO-ENTMCNC: 31.4 G/DL (ref 32–36.5)
MCV RBC AUTO: 102 FL (ref 80–96)
MONOCYTES # BLD AUTO: 0.6 10^3/UL (ref 0–0.8)
MONOCYTES NFR BLD AUTO: 9.5 % (ref 0–5)
NEUTROPHILS # BLD AUTO: 2.3 10^3/UL (ref 1.5–8.5)
NEUTROPHILS NFR BLD AUTO: 39.4 % (ref 36–66)
PLATELET # BLD AUTO: 134 10^3/UL (ref 150–450)
POTASSIUM SERPL-SCNC: 4.1 MEQ/L (ref 3.5–5.1)
RBC # BLD AUTO: 3.43 10^6/UL (ref 4–5.4)
SODIUM SERPL-SCNC: 146 MEQ/L (ref 136–145)
WBC # BLD AUTO: 5.8 10^3/UL (ref 4–10)

## 2021-02-12 RX ADMIN — ASPIRIN SCH MG: 81 TABLET ORAL at 08:30

## 2021-02-12 RX ADMIN — METOPROLOL TARTRATE SCH MG: 25 TABLET, FILM COATED ORAL at 08:30

## 2021-02-12 RX ADMIN — CEPHALEXIN SCH MG: 500 CAPSULE ORAL at 20:22

## 2021-02-12 RX ADMIN — MULTIPLE VITAMINS W/ MINERALS TAB SCH TAB: TAB at 08:30

## 2021-02-12 RX ADMIN — WHITE PETROLATUM SCH DOSE: 57; 17 PASTE TOPICAL at 20:22

## 2021-02-12 RX ADMIN — SODIUM CHLORIDE SCH UNITS: 4.5 INJECTION, SOLUTION INTRAVENOUS at 20:22

## 2021-02-12 RX ADMIN — WHITE PETROLATUM SCH DOSE: 57; 17 PASTE TOPICAL at 15:48

## 2021-02-12 RX ADMIN — METOPROLOL TARTRATE SCH MG: 25 TABLET, FILM COATED ORAL at 20:21

## 2021-02-12 RX ADMIN — PROBIOTIC PRODUCT - TAB SCH EA: TAB at 20:23

## 2021-02-12 RX ADMIN — LISINOPRIL SCH MG: 2.5 TABLET ORAL at 08:30

## 2021-02-12 RX ADMIN — SIMVASTATIN SCH MG: 40 TABLET, FILM COATED ORAL at 20:20

## 2021-02-12 RX ADMIN — DOCUSATE SODIUM SCH MG: 100 CAPSULE, LIQUID FILLED ORAL at 08:31

## 2021-02-12 RX ADMIN — DOCUSATE SODIUM SCH MG: 100 CAPSULE, LIQUID FILLED ORAL at 20:23

## 2021-02-12 RX ADMIN — SENNOSIDES SCH TAB: 8.6 TABLET, FILM COATED ORAL at 20:23

## 2021-02-12 RX ADMIN — PANTOPRAZOLE SODIUM SCH MG: 40 TABLET, DELAYED RELEASE ORAL at 08:29

## 2021-02-12 RX ADMIN — Medication SCH UNITS: at 08:31

## 2021-02-12 RX ADMIN — LEVOTHYROXINE SODIUM SCH MCG: 88 TABLET ORAL at 05:27

## 2021-02-12 RX ADMIN — PROBIOTIC PRODUCT - TAB SCH EA: TAB at 17:03

## 2021-02-12 RX ADMIN — WHITE PETROLATUM SCH DOSE: 57; 17 PASTE TOPICAL at 08:31

## 2021-02-12 RX ADMIN — CEPHALEXIN SCH MG: 500 CAPSULE ORAL at 17:03

## 2021-02-12 RX ADMIN — SODIUM CHLORIDE SCH UNITS: 4.5 INJECTION, SOLUTION INTRAVENOUS at 08:30

## 2021-02-12 RX ADMIN — CALCIUM SCH MG: 500 TABLET ORAL at 08:29

## 2021-02-12 NOTE — REPVR
PROCEDURE INFORMATION: 

Exam: US Duplex Left Upper Extremity Veins, Limited 

Exam date and time: 2/12/2021 6:09 PM 

Age: 80 years old 

Clinical indication: Pain; Arm, upper; Left; Additional info: Antecubital fossa 

swelling 



TECHNIQUE: 

Imaging protocol: Real-time Duplex ultrasound of the Left Upper Extremity with 

2-D gray scale, color Doppler flow and spectral waveform analysis with image 

documentation. Limited exam focused on the left upper extremity veins. 



COMPARISON: 

No relevant prior studies available. 



FINDINGS: 

Left deep veins: Unremarkable. Axillary and brachial veins are patent 

throughout without thrombus. Normal Doppler waveforms. Normal compressibility 

and/or augmentation response. Visualized internal jugular and subclavian veins 

are patent. 

Left superficial veins:  There is occlusion of the left cephalic vein with 

thrombus from the level of the mid arm to the antecubital fossa.  This is 

probably a combination of acute and chronic thrombus.  There are echogenic 

fibrous strands identified along with acute hypoechoic thrombus.

There is venous return of the basilic vein.





IMPRESSION: 

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 

vein from the mid arm to the antecubital fossa.  Linear echogenic areas are 

probably fibrotic strands mixed with acute hypoechoic occluding superficial 

thrombosis.



Electronically signed by: Tommy Patel On 02/12/2021  19:54:30 PM

## 2021-02-13 VITALS — DIASTOLIC BLOOD PRESSURE: 67 MMHG | SYSTOLIC BLOOD PRESSURE: 137 MMHG

## 2021-02-13 VITALS — SYSTOLIC BLOOD PRESSURE: 163 MMHG | DIASTOLIC BLOOD PRESSURE: 74 MMHG

## 2021-02-13 VITALS — DIASTOLIC BLOOD PRESSURE: 81 MMHG | SYSTOLIC BLOOD PRESSURE: 140 MMHG

## 2021-02-13 VITALS — SYSTOLIC BLOOD PRESSURE: 170 MMHG | DIASTOLIC BLOOD PRESSURE: 68 MMHG

## 2021-02-13 RX ADMIN — PROBIOTIC PRODUCT - TAB SCH EA: TAB at 20:50

## 2021-02-13 RX ADMIN — PROBIOTIC PRODUCT - TAB SCH EA: TAB at 08:46

## 2021-02-13 RX ADMIN — APIXABAN SCH MG: 5 TABLET, FILM COATED ORAL at 08:47

## 2021-02-13 RX ADMIN — ASPIRIN SCH MG: 81 TABLET ORAL at 08:46

## 2021-02-13 RX ADMIN — WHITE PETROLATUM SCH DOSE: 57; 17 PASTE TOPICAL at 16:00

## 2021-02-13 RX ADMIN — PROBIOTIC PRODUCT - TAB SCH EA: TAB at 12:03

## 2021-02-13 RX ADMIN — DOCUSATE SODIUM SCH MG: 100 CAPSULE, LIQUID FILLED ORAL at 20:49

## 2021-02-13 RX ADMIN — CEPHALEXIN SCH MG: 500 CAPSULE ORAL at 08:46

## 2021-02-13 RX ADMIN — PROBIOTIC PRODUCT - TAB SCH EA: TAB at 17:05

## 2021-02-13 RX ADMIN — DOCUSATE SODIUM SCH MG: 100 CAPSULE, LIQUID FILLED ORAL at 08:02

## 2021-02-13 RX ADMIN — PANTOPRAZOLE SODIUM SCH MG: 40 TABLET, DELAYED RELEASE ORAL at 08:46

## 2021-02-13 RX ADMIN — LISINOPRIL SCH MG: 2.5 TABLET ORAL at 05:28

## 2021-02-13 RX ADMIN — WHITE PETROLATUM SCH DOSE: 57; 17 PASTE TOPICAL at 08:47

## 2021-02-13 RX ADMIN — WHITE PETROLATUM SCH DOSE: 57; 17 PASTE TOPICAL at 20:51

## 2021-02-13 RX ADMIN — SIMVASTATIN SCH MG: 40 TABLET, FILM COATED ORAL at 20:50

## 2021-02-13 RX ADMIN — LEVOTHYROXINE SODIUM SCH MCG: 88 TABLET ORAL at 05:27

## 2021-02-13 RX ADMIN — METOPROLOL TARTRATE SCH MG: 25 TABLET, FILM COATED ORAL at 08:46

## 2021-02-13 RX ADMIN — CEPHALEXIN SCH MG: 500 CAPSULE ORAL at 20:50

## 2021-02-13 RX ADMIN — CEPHALEXIN SCH MG: 500 CAPSULE ORAL at 12:03

## 2021-02-13 RX ADMIN — MULTIPLE VITAMINS W/ MINERALS TAB SCH TAB: TAB at 08:46

## 2021-02-13 RX ADMIN — Medication SCH UNITS: at 08:46

## 2021-02-13 RX ADMIN — APIXABAN SCH MG: 5 TABLET, FILM COATED ORAL at 20:50

## 2021-02-13 RX ADMIN — METOPROLOL TARTRATE SCH MG: 25 TABLET, FILM COATED ORAL at 20:51

## 2021-02-13 RX ADMIN — SENNOSIDES SCH TAB: 8.6 TABLET, FILM COATED ORAL at 20:50

## 2021-02-13 RX ADMIN — CEPHALEXIN SCH MG: 500 CAPSULE ORAL at 17:05

## 2021-02-13 RX ADMIN — CALCIUM SCH MG: 500 TABLET ORAL at 08:47

## 2021-02-13 NOTE — IPNPDOC
Date Seen


The patient was seen on 2/13/21.





Progress Note


SUBJECTIVE: 


Started on eliquis BID due to recent stroke, ? cardioembolic cause and severe 

pain and swelling. Warm compresses helping. No shortness of breath, n/v/d, chest

pain. 





OBJECTIVE: 





PHYSICAL EXAMINATION: 


VS: Please see below 


CONSTITUTIONAL: No acute distress, resting comfortably sitting at bedside chair 

, AAO x 3


EYES: PERRLA, EOM intact


HENT, MOUTH: Right side facial droop,  moist mucous membranes


NECK: SUPPLE, no JVD, no lymphadenopathy, no carotid bruit


CV: Regular rate and rhythm, S1S2 normal, no murmurs/rubs/gallops


RESPIRATORY: Clear to auscultation bilaterally, no rales/rhonchi/wheezes


GI: obese abd, BS positive in 4 quadrants, soft, nontender, nondistended, no 

rebound or guarding, no organomegaly


: Deferred


MUSCULOSKELETAL: Left upper ext swollen, tender in AC area. Normal ROM. No 

cyanosis, clubbing, swelling, joint deformity, extremity edema


INTEGUMENTARY:  Intact, no rashes, no lesions, no erythema


NEUROLOGIC: Strength 5/5 in all upper ext, in LLE. 4/5 strength in LLE 

(chronic). Right side facial droop, no tongue deviation, reflexes intact in all 

extremities 


PSYCHIATRIC: Mood and affect are normal 





LABORATORY DATA: 


Please see below 





IMAGING: 


US LUE: 


There is both acute and chronic occluding thrombus within the left cephalic vein

from the mid arm to the antecubital fossa.  Linear echogenic areas are 


probably fibrotic strands mixed with acute hypoechoic occluding superficial thro

mbosis.





ASSESSMENT: Patient is an 80-year-old female with past medical history breast 

cancer status post radiation, colon cancer status post hemicolectomy in 

remission, hypertension, hyperlipidemia, CAD status post CABG, GERD, arthritis, 

anemia, hypothyroidism who was transferred to ARU from [facility] after having acute ischemic stroke of the left MCA, left frontal 

region. 





DIAGNOSES: 





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


Acute left MCA, left frontal region ischemic stroke possibly cardioembolic


CAD status post CABG


Hypertension


Low-grade mucinous adenocarcinoma right breast, status post lumpectomy and 

radiation therapy


History of colon cancer status post hemicolectomy


Hyperlipidemia


Arthritis


Hypothyroidism





PLAN: 


Due to recent events with stroke, possible cryptogenic/thrombotic cause not 

being able to be identified, increased severe pain and hx of cancer being 

hypercoaguable, decision was made to treat with anticoagulation. Started on 

eliquis 10 mg PO BID x 7 days. Duration of treatment is not clearly stated; 

however, it is recommend to re-US in several weeks to see if resolution of clot.

Unlike guidelines to DVT's, superficial thromboses guidelines go off of clinical

picture and risk factors for duration of treatment with AC. Will discuss with 

Dr. Cruz's after the weekend. C/w current PT/OT schedule and active 

medications. Other chronic issues appear stable. Goal is home after ARU.





VS, I&O, 24H, Fishbone


Vital Signs/I&O





Vital Signs








  Date Time  Temp Pulse Resp B/P (MAP) Pulse Ox O2 Delivery O2 Flow Rate FiO2


 


2/13/21 08:46  66  137/67    


 


2/13/21 05:11 97.3  17  98 Room Air  














I&O- Last 24 Hours up to 6 AM 


 


 2/13/21





 06:00


 


Intake Total 1000 ml


 


Balance 1000 ml











Current Medications





Current Medications








 Medications


  (Trade)  Dose


 Ordered  Sig/Supriya


 Route


 PRN Reason  Start Time


 Stop Time Status Last Admin


Dose Admin


 


 Acetaminophen


  (Tylenol Tab)  650 mg  Q4HP  PRN


 PO


 fever/MILD PAIN (PS 1-4)  2/9/21 14:00


     





 


 Apixaban


  (Eliquis)  5 mg  BID


 PO


   2/20/21 09:00


     





 


 Apixaban


  (Eliquis)  10 mg  BID


 PO


   2/13/21 09:00


 2/19/21 21:01  2/13/21 08:47





 


 Aspirin


  (Ecotrin)  81 mg  DAILY


 PO


   2/10/21 09:00


    2/13/21 08:46





 


 Bisacodyl


  (Dulcolax


 Suppository)  10 mg  DAILYPRN  PRN


 VT


 CONSTIPATION  2/9/21 14:00


     





 


 Bisacodyl


  (Dulcolax Tab)  5 mg  DAILYPRN  PRN


 PO


 CONSTIPATION  2/9/21 14:00


     





 


 Calcium Carbonate


  (Oscal)  500 mg  DAILY


 PO


   2/10/21 09:00


    2/13/21 08:47





 


 Cephalexin


 Monohydrate


  (Keflex)  500 mg  QID


 PO


   2/12/21 17:00


 2/17/21 13:01  2/13/21 08:46





 


 Docusate Sodium


  (Colace)  100 mg  BID


 PO


   2/9/21 21:00


    2/12/21 08:31





 


 Heparin Sodium


  (Porcine)


  (Heparin)  5,000 units  Q12H


 SC


   2/10/21 09:00


 2/13/21 07:45 DC 2/12/21 20:22





 


 Home Med


  (Med Rec


 Complete!)    ASDIRECTED


 XX


   2/9/21 18:45


 2/9/21 18:54 DC  





 


 Lactobacillus


 Acidophilus


  (Bacid)  1 ea  WMHS


 PO


   2/12/21 18:00


    2/13/21 08:46





 


 Levothyroxine


 Sodium


  (Synthroid)  88 mcg  DAILY@06


 PO


   2/10/21 06:00


    2/13/21 05:27





 


 Lisinopril


  (Prinivil)  2.5 mg  DAILY


 PO


   2/10/21 09:00


 2/12/21 10:31 DC 2/12/21 08:30





 


 Lisinopril


  (Prinivil)  5 mg  DAILY


 PO


   2/13/21 09:00


    2/13/21 05:28





 


 Metoprolol


 Tartrate


  (Lopressor)  25 mg  BID


 PO


   2/11/21 10:15


    2/13/21 08:46





 


 Multivitamins


  (Theragram-M)  1 tab  DAILY


 PO


   2/10/21 09:00


    2/13/21 08:46





 


 Pantoprazole


 Sodium


  (Protonix)  40 mg  DAILY


 PO


   2/10/21 09:00


    2/13/21 08:46





 


 Senna


  (Senokot)  1 tab  QHS


 PO


   2/9/21 21:00


    2/11/21 20:06





 


 Simvastatin


  (Zocor)  40 mg  QHS


 PO


   2/9/21 21:00


    2/12/21 20:20





 


 Vitamin D


  (Vitamin D)  1,000 units  DAILY


 PO


   2/10/21 09:00


    2/13/21 08:46














Allergies


Coded Allergies:  


     No Known Allergies (Unverified , 11/13/18)











Di Vo MD            Feb 13, 2021 12:09

## 2021-02-14 VITALS — DIASTOLIC BLOOD PRESSURE: 59 MMHG | SYSTOLIC BLOOD PRESSURE: 116 MMHG

## 2021-02-14 VITALS — DIASTOLIC BLOOD PRESSURE: 68 MMHG | SYSTOLIC BLOOD PRESSURE: 129 MMHG

## 2021-02-14 VITALS — SYSTOLIC BLOOD PRESSURE: 145 MMHG | DIASTOLIC BLOOD PRESSURE: 88 MMHG

## 2021-02-14 VITALS — DIASTOLIC BLOOD PRESSURE: 72 MMHG | SYSTOLIC BLOOD PRESSURE: 164 MMHG

## 2021-02-14 RX ADMIN — SIMVASTATIN SCH MG: 40 TABLET, FILM COATED ORAL at 20:25

## 2021-02-14 RX ADMIN — METOPROLOL TARTRATE SCH MG: 25 TABLET, FILM COATED ORAL at 08:27

## 2021-02-14 RX ADMIN — CALCIUM SCH MG: 500 TABLET ORAL at 08:26

## 2021-02-14 RX ADMIN — CEPHALEXIN SCH MG: 500 CAPSULE ORAL at 12:09

## 2021-02-14 RX ADMIN — APIXABAN SCH MG: 5 TABLET, FILM COATED ORAL at 20:25

## 2021-02-14 RX ADMIN — PROBIOTIC PRODUCT - TAB SCH EA: TAB at 11:45

## 2021-02-14 RX ADMIN — METOPROLOL TARTRATE SCH MG: 25 TABLET, FILM COATED ORAL at 20:26

## 2021-02-14 RX ADMIN — PROBIOTIC PRODUCT - TAB SCH EA: TAB at 08:27

## 2021-02-14 RX ADMIN — DOCUSATE SODIUM SCH MG: 100 CAPSULE, LIQUID FILLED ORAL at 20:25

## 2021-02-14 RX ADMIN — WHITE PETROLATUM SCH DOSE: 57; 17 PASTE TOPICAL at 08:27

## 2021-02-14 RX ADMIN — WHITE PETROLATUM SCH DOSE: 57; 17 PASTE TOPICAL at 16:00

## 2021-02-14 RX ADMIN — PANTOPRAZOLE SODIUM SCH MG: 40 TABLET, DELAYED RELEASE ORAL at 08:26

## 2021-02-14 RX ADMIN — CEPHALEXIN SCH MG: 500 CAPSULE ORAL at 08:26

## 2021-02-14 RX ADMIN — SENNOSIDES SCH TAB: 8.6 TABLET, FILM COATED ORAL at 20:25

## 2021-02-14 RX ADMIN — LEVOTHYROXINE SODIUM SCH MCG: 88 TABLET ORAL at 05:30

## 2021-02-14 RX ADMIN — APIXABAN SCH MG: 5 TABLET, FILM COATED ORAL at 08:26

## 2021-02-14 RX ADMIN — ASPIRIN SCH MG: 81 TABLET ORAL at 08:27

## 2021-02-14 RX ADMIN — DOCUSATE SODIUM SCH MG: 100 CAPSULE, LIQUID FILLED ORAL at 08:27

## 2021-02-14 RX ADMIN — Medication SCH UNITS: at 08:26

## 2021-02-14 RX ADMIN — PROBIOTIC PRODUCT - TAB SCH EA: TAB at 17:11

## 2021-02-14 RX ADMIN — CEPHALEXIN SCH MG: 500 CAPSULE ORAL at 20:24

## 2021-02-14 RX ADMIN — MULTIPLE VITAMINS W/ MINERALS TAB SCH TAB: TAB at 08:26

## 2021-02-14 RX ADMIN — PROBIOTIC PRODUCT - TAB SCH EA: TAB at 20:24

## 2021-02-14 RX ADMIN — WHITE PETROLATUM SCH DOSE: 57; 17 PASTE TOPICAL at 20:25

## 2021-02-14 RX ADMIN — CEPHALEXIN SCH MG: 500 CAPSULE ORAL at 17:11

## 2021-02-14 RX ADMIN — LISINOPRIL SCH MG: 2.5 TABLET ORAL at 05:30

## 2021-02-15 VITALS — SYSTOLIC BLOOD PRESSURE: 137 MMHG | DIASTOLIC BLOOD PRESSURE: 79 MMHG

## 2021-02-15 VITALS — DIASTOLIC BLOOD PRESSURE: 74 MMHG | SYSTOLIC BLOOD PRESSURE: 146 MMHG

## 2021-02-15 VITALS — SYSTOLIC BLOOD PRESSURE: 133 MMHG | DIASTOLIC BLOOD PRESSURE: 77 MMHG

## 2021-02-15 LAB
BASOPHILS # BLD AUTO: 0.1 10^3/UL (ref 0–0.2)
BASOPHILS NFR BLD AUTO: 1 % (ref 0–1)
BUN SERPL-MCNC: 19 MG/DL (ref 7–18)
CALCIUM SERPL-MCNC: 8.8 MG/DL (ref 8.8–10.2)
CHLORIDE SERPL-SCNC: 111 MEQ/L (ref 98–107)
CO2 SERPL-SCNC: 27 MEQ/L (ref 21–32)
CREAT SERPL-MCNC: 1.21 MG/DL (ref 0.55–1.3)
EOSINOPHIL # BLD AUTO: 0.3 10^3/UL (ref 0–0.5)
EOSINOPHIL NFR BLD AUTO: 5.2 % (ref 0–3)
GFR SERPL CREATININE-BSD FRML MDRD: 45.6 ML/MIN/{1.73_M2} (ref 32–?)
GLUCOSE SERPL-MCNC: 87 MG/DL (ref 70–100)
HCT VFR BLD AUTO: 35.6 % (ref 36–47)
HGB BLD-MCNC: 11.5 G/DL (ref 12–15.5)
LYMPHOCYTES # BLD AUTO: 2.6 10^3/UL (ref 1.5–5)
LYMPHOCYTES NFR BLD AUTO: 42.5 % (ref 24–44)
MCH RBC QN AUTO: 33.1 PG (ref 27–33)
MCHC RBC AUTO-ENTMCNC: 32.3 G/DL (ref 32–36.5)
MCV RBC AUTO: 102.6 FL (ref 80–96)
MONOCYTES # BLD AUTO: 0.7 10^3/UL (ref 0–0.8)
MONOCYTES NFR BLD AUTO: 10.7 % (ref 2–8)
NEUTROPHILS # BLD AUTO: 2.5 10^3/UL (ref 1.5–8.5)
NEUTROPHILS NFR BLD AUTO: 40.3 % (ref 36–66)
PLATELET # BLD AUTO: 146 10^3/UL (ref 150–450)
POTASSIUM SERPL-SCNC: 4.4 MEQ/L (ref 3.5–5.1)
RBC # BLD AUTO: 3.47 10^6/UL (ref 4–5.4)
SODIUM SERPL-SCNC: 144 MEQ/L (ref 136–145)
WBC # BLD AUTO: 6.2 10^3/UL (ref 4–10)

## 2021-02-15 RX ADMIN — DOCUSATE SODIUM SCH MG: 100 CAPSULE, LIQUID FILLED ORAL at 10:34

## 2021-02-15 RX ADMIN — METOPROLOL TARTRATE SCH MG: 25 TABLET, FILM COATED ORAL at 20:12

## 2021-02-15 RX ADMIN — DOCUSATE SODIUM SCH MG: 100 CAPSULE, LIQUID FILLED ORAL at 20:12

## 2021-02-15 RX ADMIN — WHITE PETROLATUM SCH DOSE: 57; 17 PASTE TOPICAL at 16:00

## 2021-02-15 RX ADMIN — ASPIRIN SCH MG: 81 TABLET ORAL at 10:33

## 2021-02-15 RX ADMIN — PROBIOTIC PRODUCT - TAB SCH EA: TAB at 13:27

## 2021-02-15 RX ADMIN — Medication SCH UNITS: at 10:33

## 2021-02-15 RX ADMIN — SIMVASTATIN SCH MG: 40 TABLET, FILM COATED ORAL at 20:12

## 2021-02-15 RX ADMIN — WHITE PETROLATUM SCH DOSE: 57; 17 PASTE TOPICAL at 10:38

## 2021-02-15 RX ADMIN — MULTIPLE VITAMINS W/ MINERALS TAB SCH TAB: TAB at 10:34

## 2021-02-15 RX ADMIN — LEVOTHYROXINE SODIUM SCH MCG: 88 TABLET ORAL at 06:00

## 2021-02-15 RX ADMIN — SENNOSIDES SCH TAB: 8.6 TABLET, FILM COATED ORAL at 20:12

## 2021-02-15 RX ADMIN — PROBIOTIC PRODUCT - TAB SCH EA: TAB at 17:29

## 2021-02-15 RX ADMIN — PROBIOTIC PRODUCT - TAB SCH EA: TAB at 20:12

## 2021-02-15 RX ADMIN — CEPHALEXIN SCH MG: 500 CAPSULE ORAL at 20:11

## 2021-02-15 RX ADMIN — CALCIUM SCH MG: 500 TABLET ORAL at 10:34

## 2021-02-15 RX ADMIN — WHITE PETROLATUM SCH DOSE: 57; 17 PASTE TOPICAL at 20:12

## 2021-02-15 RX ADMIN — CEPHALEXIN SCH MG: 500 CAPSULE ORAL at 13:27

## 2021-02-15 RX ADMIN — CEPHALEXIN SCH MG: 500 CAPSULE ORAL at 10:33

## 2021-02-15 RX ADMIN — CEPHALEXIN SCH MG: 500 CAPSULE ORAL at 17:29

## 2021-02-15 RX ADMIN — PROBIOTIC PRODUCT - TAB SCH EA: TAB at 10:33

## 2021-02-15 RX ADMIN — PANTOPRAZOLE SODIUM SCH MG: 40 TABLET, DELAYED RELEASE ORAL at 10:33

## 2021-02-15 RX ADMIN — METOPROLOL TARTRATE SCH MG: 25 TABLET, FILM COATED ORAL at 10:34

## 2021-02-15 RX ADMIN — LISINOPRIL SCH MG: 2.5 TABLET ORAL at 10:33

## 2021-02-16 VITALS — DIASTOLIC BLOOD PRESSURE: 68 MMHG | SYSTOLIC BLOOD PRESSURE: 146 MMHG

## 2021-02-16 RX ADMIN — METOPROLOL TARTRATE SCH MG: 25 TABLET, FILM COATED ORAL at 08:15

## 2021-02-16 RX ADMIN — LEVOTHYROXINE SODIUM SCH MCG: 88 TABLET ORAL at 05:42

## 2021-02-16 RX ADMIN — DOCUSATE SODIUM SCH MG: 100 CAPSULE, LIQUID FILLED ORAL at 08:14

## 2021-02-16 RX ADMIN — MULTIPLE VITAMINS W/ MINERALS TAB SCH TAB: TAB at 08:14

## 2021-02-16 RX ADMIN — CEPHALEXIN SCH MG: 500 CAPSULE ORAL at 12:02

## 2021-02-16 RX ADMIN — Medication SCH UNITS: at 08:14

## 2021-02-16 RX ADMIN — CEPHALEXIN SCH MG: 500 CAPSULE ORAL at 08:14

## 2021-02-16 RX ADMIN — PROBIOTIC PRODUCT - TAB SCH EA: TAB at 11:43

## 2021-02-16 RX ADMIN — ASPIRIN SCH MG: 81 TABLET ORAL at 08:14

## 2021-02-16 RX ADMIN — WHITE PETROLATUM SCH DOSE: 57; 17 PASTE TOPICAL at 08:15

## 2021-02-16 RX ADMIN — PANTOPRAZOLE SODIUM SCH MG: 40 TABLET, DELAYED RELEASE ORAL at 08:14

## 2021-02-16 RX ADMIN — DOCUSATE SODIUM SCH MG: 100 CAPSULE, LIQUID FILLED ORAL at 08:20

## 2021-02-16 RX ADMIN — PROBIOTIC PRODUCT - TAB SCH EA: TAB at 08:14

## 2021-02-16 RX ADMIN — CALCIUM SCH MG: 500 TABLET ORAL at 08:14

## 2021-02-16 NOTE — PMRDS
NAME:  ALLAN CHOI          [facility] WT ID#:  [id number]             

:  [id number]                JOB:  1007                   

NAVJOT:  2021                ACCT: [id number]             

MRN:  [id number]                  

DOCTOR:  ART HAYNES MD





                             PMR DISCHARGE SUMMARY



DATE OF ADMISSION:  2021

DATE OF DISCHARGE:  2021



CHIEF COMPLAINT/DISCHARGE DIAGNOSIS: Stroke. 



HISTORY OF PRESENT ILLNESS: This is an 80-year-old female with a past medical

history of hyperlipidemia, hypertension, CAD status post CABG, hypothyroidism,

neuropathy, breast cancer status post radiation, and colon cancer status post

hemicolectomy who presented to [facility] ED on 2021 with dysarthria.

CTH was negative for acute hemorrhage, and CTA head and neck showed "multiple

hypoattenuating lesions within bilateral subinsular region, right basal

ganglia, and right frontal subcortical white matter and left frontal regions

which are most consistent with a subacute or chronic lacunar infarctsno

occlusion or significant stenosis in the arteries of the neck." Patient

received TPA. Follow up MRI showed "acute lacunar infarcts in the left middle

frontal lobe at the level of the centrum semiovaleold lacunar infarcts in the

basal ganglia bilaterally." ABUNDIO with bubble study showed "patent foramen ovale

with grade 1 right to left shunt." Doppler venous study 2021 was negative

for DVTs, and she was evaluated by Cardiology who placed a loop recorder for

her cryptogenic stroke on 2021. She was maintained on aspirin and statin

for secondary stroke prevention, found to have mobility and ADL impairments,

placed on a dysphagia diet, and deemed medically appropriate for discharge to

ARU.



PAST MEDICAL HISTORY: As per HPI. 



HOSPITAL COURSE: Patient was admitted and enrolled in a comprehensive PT, OT,

speech, and language pathology program. She received 24 hour nursing

supervision, and weekly team meetings were held to discuss her progress. During

her hospital course, the patient was diagnosed with left upper extremity

superficial venous thrombosis for which she was treated ultimately with warm

compresses. She was also treated for soft tissue infection in her left upper

extremity with Keflex with overall improvements in her symptoms. Her lisinopril

was held due to slight decrease in her kidney function, and metoprolol was

added to her blood pressure regimen. She was deemed medically and functionally

stable having made significant gains in therapy and determined to be safe to

return home. 



DISCHARGE MEDICATIONS: As per instructions.



FUNCTIONAL HISTORY: On discharge, patient was modified independent, independent

for all functional transfers, ambulation, dressing, and stairs. 



Thank you for this referral. 



















DD:  2021 12:00:00 am

DT:  2021 10:06:27 am

## 2021-02-16 NOTE — IPNPDOC
PM&R Progress Note


DATE OF SERVICE:  Feb 12, 2021


Physiatrist Progress Note


Subjective:





Patient stating her left inner elbow feels sore and that she has a lump in the 

elbow crease where prior IV was and feels additional lumps in her upper arm. 








REVIEW OF SYSTEMS: The following is a completed review of systems and has been r

eviewed. Review of systems otherwise unremarkable.


PAIN: Patient self reports no pain


EYES: No recent vision changes


EARS, NOSE, & THROAT: +dysphagia


CARDIOVASCULAR: denies chest pain or palpitations


PULMONARY: Denies shortness of breath


GASTROINTESTINAL: Denies constipation/diarrhea


GENITOURINARY: denies dysuria


MUSCULOSKELETAL: generalized weakness


NEUROLOGICAL:dysphagia and mild aphasia.


HEMATOLOGICAL:denies easy bruising


SKIN: no rash 


PSYCHIATRIC: Unremarkable


All other review of systems found to be negative.











PHYSICAL EXAMINATION:


VITAL SIGNS: Please see below.


GENERAL: Pleasant and cooperative. No acute distress. 


HEENT: PERRL. Extraocular movements intact. Clear conjunctiva, slight right 

sided facial droop


CARDIOVASCULAR: Regular rate and rhythm. No murmurs, rubs, or gallops


LUNGS: Clear to auscultation bilaterally. No wheezes. No rhonchi


ABDOMEN: Soft, nontender, nondistended. Positive bowel sounds. Normal active 

bowel sounds


NEUROLOGICAL: Alert and oriented times three. Cranial nerves II through XII 

grossly intact. Sensation grossly intact


-no dysmetria, mild right sided pronator drift, +mild expressive aphasia, visual

fields intact 


-no clonus/shah's





EXTREMITIES: 5\5 strength bilateral upper extremities. 5\5 strength right knee 

extension/flexion/ ankle DF/PF (hip flexion 4/5 chronic), 5/5 strength in left 

lower extremity. 





LUE- +warmth and tenderness to palpation in antecubital fossa with raised 

lump/furuncle at site of previous IV access





SKIN: chest wall loop recorder incision











ASSESSMENT:80-year-old F with past medical history of HTN who presents status 

post stroke





PLAN:


1.Rehab- PT/OT advance mobility and ADLs, strengthen/stretch/maintain ROM all 4l

limbs, ambulating further distances


-SLP- c/u therapy for dysphagia


2. Neuro- s/p acute lacunar infarcts in the left middle frontal lobe at the 

level of the centrum semiovale, c/u ASA and statin for secondary stroke p

revention-


LOOP recorder in place to check for Afib


-+PFO on recent ABUNDIO, f/u with cardiology at [facility]- LE Dopplers negative for DVT 

performed at [facility] 2-9-21  


3. CArdiac- hx of CAD with CABG, c/u ASA and lisinopril


-HLD- c/u zocor


-medicine consulted to assist in overall management


4. Endo- hx of hypothyroidism c/u synthroid


5. GI ppx-protonix


6. DVT ppx - teds and heparin


-will order LUE Doppler to r/o DVT, warm compress for suspected phlebitis, 

concern for cellulitis/furuncle as well and will start Keflex 


7. pain- tylenol prn


8. Dispo- tbd


Allergies


Coded Allergies:  


     No Known Allergies (Unverified , 11/13/18)





Vital Signs





Vital Signs








  Date Time  Temp Pulse Resp B/P (MAP) Pulse Ox O2 Delivery O2 Flow Rate FiO2


 


2/16/21 08:15  69  146/68    


 


2/16/21 06:03 97.4  18  99 Room Air  











Current Medications


Current Medications





Current Medications








 Medications


  (Trade)  Dose


 Ordered  Sig/Supriya


 Route


 PRN Reason  Start Time


 Stop Time Status Last Admin


Dose Admin


 


 Acetaminophen


  (Tylenol Tab)  650 mg  Q4HP  PRN


 PO


 fever/MILD PAIN (PS 1-4)  2/9/21 14:00


     





 


 Apixaban


  (Eliquis)  5 mg  BID


 PO


   2/20/21 09:00


 2/15/21 10:15 DC  





 


 Apixaban


  (Eliquis)  10 mg  BID


 PO


   2/13/21 09:00


 2/15/21 10:15 DC 2/14/21 20:25





 


 Aspirin


  (Ecotrin)  81 mg  DAILY


 PO


   2/10/21 09:00


    2/16/21 08:14





 


 Bisacodyl


  (Dulcolax


 Suppository)  10 mg  DAILYPRN  PRN


 ID


 CONSTIPATION  2/9/21 14:00


     





 


 Bisacodyl


  (Dulcolax Tab)  5 mg  DAILYPRN  PRN


 PO


 CONSTIPATION  2/9/21 14:00


     





 


 Calcium Carbonate


  (Oscal)  500 mg  DAILY


 PO


   2/10/21 09:00


    2/16/21 08:14





 


 Cephalexin


 Monohydrate


  (Keflex)  500 mg  QID


 PO


   2/12/21 17:00


 2/17/21 13:01  2/16/21 08:14





 


 Docusate Sodium


  (Colace)  100 mg  BID


 PO


   2/9/21 21:00


    2/15/21 20:12





 


 Heparin Sodium


  (Porcine)


  (Heparin)  5,000 units  Q12H


 SC


   2/10/21 09:00


 2/13/21 07:45 DC 2/12/21 20:22





 


 Home Med


  (Med Rec


 Complete!)    ASDIRECTED


 XX


   2/9/21 18:45


 2/9/21 18:54 DC  





 


 Lactobacillus


 Acidophilus


  (Bacid)  1 ea  WMHS


 PO


   2/12/21 18:00


    2/16/21 08:14





 


 Levothyroxine


 Sodium


  (Synthroid)  88 mcg  DAILY@06


 PO


   2/10/21 06:00


    2/16/21 05:42





 


 Lisinopril


  (Prinivil)  2.5 mg  DAILY


 PO


   2/10/21 09:00


 2/12/21 10:31 DC 2/12/21 08:30





 


 Lisinopril


  (Prinivil)  5 mg  DAILY


 PO


   2/13/21 09:00


 2/15/21 10:40 DC 2/15/21 10:33





 


 Lisinopril


  (Prinivil)  5 mg  DAILY


 PO


   2/16/21 09:00


    2/16/21 08:14





 


 Metoprolol


 Tartrate


  (Lopressor)  25 mg  BID


 PO


   2/11/21 10:15


    2/16/21 08:15





 


 Multivitamins


  (Theragram-M)  1 tab  DAILY


 PO


   2/10/21 09:00


    2/16/21 08:14





 


 Pantoprazole


 Sodium


  (Protonix)  40 mg  DAILY


 PO


   2/10/21 09:00


    2/16/21 08:14





 


 Senna


  (Senokot)  1 tab  QHS


 PO


   2/9/21 21:00


    2/15/21 20:12





 


 Simvastatin


  (Zocor)  40 mg  QHS


 PO


   2/9/21 21:00


    2/15/21 20:12





 


 Vitamin D


  (Vitamin D)  1,000 units  DAILY


 PO


   2/10/21 09:00


    2/16/21 08:14




















ART HAYNES MD         Feb 16, 2021 09:50

## 2021-03-22 ENCOUNTER — HOSPITAL ENCOUNTER (OUTPATIENT)
Dept: HOSPITAL 53 - M PLALAB | Age: 81
End: 2021-03-22
Attending: SURGERY
Payer: MEDICARE

## 2021-03-22 DIAGNOSIS — Z13.79: Primary | ICD-10-CM

## 2021-04-01 ENCOUNTER — HOSPITAL ENCOUNTER (OUTPATIENT)
Dept: HOSPITAL 53 - M WHC | Age: 81
End: 2021-04-01
Attending: SURGERY
Payer: MEDICARE

## 2021-04-01 DIAGNOSIS — R22.2: Primary | ICD-10-CM

## 2021-04-01 DIAGNOSIS — C50.911: ICD-10-CM

## 2021-04-01 NOTE — REP
INDICATION:

C50.911 MUCINOUS CA RT BREAST,LT AXILLARY lymph NODE.



COMPARISON:

None.



TECHNIQUE:

Real-time sonographic evaluation of left axilla and midline chest performed.



FINDINGS:

At the site of the palpable lump over the sternum there is a cyst with wall thickening

and internal septations measuring 1.3 x 0.5 x 1.1 cm. In the left axilla multiple

morphologically normal appearing lymph nodes are seen, the largest measures 7 x 5 x 17

mm.



IMPRESSION:

In the left axilla multiple morphologically normal appearing lymph nodes are seen

which are not significantly enlarged.  Clinical correlation and follow-up recommended.



At the site of the palpable lump overlying the sternum there is a cyst with wall

thickening and internal septations measuring 1.3 x 0.5 x 1.1 cm.  If this is deemed

clinically suspicious then recommend ultrasound-guided sampling to rule out cystic

neoplasm.





<Electronically signed by Jonatan Duron > 04/01/21

## 2021-04-15 ENCOUNTER — HOSPITAL ENCOUNTER (OUTPATIENT)
Dept: HOSPITAL 53 - M SFHCWAGY | Age: 81
End: 2021-04-15
Attending: SURGERY
Payer: MEDICARE

## 2021-04-15 DIAGNOSIS — L72.11: Primary | ICD-10-CM

## 2021-04-26 NOTE — REPMRS
Patient History

Malignant radio exam breast specimen of the right breast, August 25, 2020.  Malignant US guided breast biopsy. of the right 

breast, July 8, 2020.

Cyst with septations seen at lump on sternum - 1.3 x 0.5 x 1.1 

cm. Left axilla scanned. Multiple nodes seen - 0.7 x 0.5 x 1.7 cm

Performed by: Yanelis Blackburn Ultrasound Technologist

 

Breast Ultrasound [facility]: Left Breast - April 1, 2021 - 

Exam #: [id number]

Technologist: Yanelis Blackburn, Ultrasound Technologist

FINDINGS:

At the site of the palpable lump over the sternum there is a cyst

with wall thickening

and internal septations measuring 1.3 x 0.5 x 1.1 cm. In the left

axilla multiple

morphologically normal appearing lymph nodes are seen, the 

largest measures 7 x 5 x 17

mm.

 

Recommendation

Needle localization and biopsy.  IMPRESSION:

In the left axilla multiple morphologically normal appearing 

lymph nodes are seen

which are not significantly enlarged.  Clinical correlation and 

follow-up recommended.

 

At the site of the palpable lump overlying the sternum there is a

cyst with wall

thickening and internal septations measuring 1.3 x 0.5 x 1.1 cm. 

If this is deemed

clinically suspicious then recommend ultrasound-guided sampling 

to rule out cystic

neoplasm.

 

 

Electronically Signed By: Jonatan Duron MD 04/26/21 [id number]

## 2021-05-05 ENCOUNTER — HOSPITAL ENCOUNTER (OUTPATIENT)
Dept: HOSPITAL 53 - M ONCR | Age: 81
End: 2021-05-05
Attending: RADIOLOGY
Payer: MEDICARE

## 2021-05-05 DIAGNOSIS — C50.311: Primary | ICD-10-CM

## 2021-05-05 NOTE — RADONC
Radiation Oncology Hx/FUP


Radiation Oncology Hx/FUP


Date of Service:  May 5, 2021


Pt Identifier


Juliann Peng is a 81 year old female seen for a followup visit today at the 

department of radiation oncology for a history of right breast cancer 

pT1cN0(sn)M0 ER/MO+ HER2- Grade 2 stage IA. She underwent lumpectomy and SLNB on

8/25/20 margins were negative. She then underwent adjuvant partial breast 

radiation 40 Gy in 15 fractions completed on 11/4/20. She has continued on an AI

with Dr. Quinn.





Diagnosis/Treatment History


Oncologic History


As above.





Recent data:





4/1/21 Left breast US


FINDINGS:


At the site of the palpable lump over the sternum there is a cyst


with wall thickening


and internal septations measuring 1.3 x 0.5 x 1.1 cm. In the left


axilla multiple


morphologically normal appearing lymph nodes are seen, the 


largest measures 7 x 5 x 17


mm.


 


Recommendation


Needle localization and biopsy.  IMPRESSION:


In the left axilla multiple morphologically normal appearing 


lymph nodes are seen


which are not significantly enlarged.  Clinical correlation and 


follow-up recommended





4/16/21 Left breast biopsy:


Benign cyst with calcifications


Interval History


Juliann reports she had a stroke with some residual right sided weakness over the

winter. She has recovered from this largely, although she still has some mild 

word finding difficulties and RLE weakness from time-to-time. At last follow up 

with Dr. Newell she notes a sub-sternal lump on the left, which appeared 

suspicious and so was biopsied, fortunately it was a pilar cyst. Juliann has no 

skin concerns today. She is due for mammograms later this month. She overall 

feels well, has been gaining weight. Appetite and energy good. She has no pain 

complaints today.


Current Therapy


AI (Dr. Quinn)


Stage


Right lower outer breast cancer pT1cN0(sn)M0 ER/MO+ HER2- Grade 2 Stage IA


Social History:  


Non-smoker


Does not drink





Allergies / Meds


Allergies:  


Coded Allergies:  


     No Known Allergies (Unverified , 11/13/18)


Home Meds


Active Scripts


Anastrozole (Anastrozole) 1 Mg Tablet, 1 TAB PO DAILY MDD 1 MG for 90 Days, #30 

TAB 3 Refills


   Prov:JACOB QUINN MD         3/5/21


Metoprolol Tartrate (Metoprolol Tartrate) 25 Mg Tablet, 25 MG PO BID, #60 TAB


   Prov:ART HAYNES MD         2/16/21


Aspirin (Aspirin) 81 Mg Tab.chew, 81 MG PO DAILY, #30 TABS


   Prov:ART HAYNES MD         2/16/21


Simvastatin (Simvastatin) 40 Mg Tab, 40 MG PO DAILY, #30 TAB


   Prov:ART HAYNES MD         2/16/21


Levothyroxine Sodium (LEVOTHYROXINE SODIUM) 88 Mcg Tab, 88 MCG PO DAILY, #30 TAB


   Prov:ART HAYNES MD         2/16/21


Reported Medications


Multivitamin (Multivitamin) 1 Each Tablet, 1 EACH PO DAILY, TAB


   2/9/21


Cholecalciferol (Vitamin D3) (Vitamin D3) 25 Mcg Capsule, 25 MCG PO DAILY, CAP


   8/24/20


Acetaminophen (Tylenol) 325 Mg Tablet, 650 MG PO Q4-6HP PRN for PAIN OR FEVER, 

TAB


   5/21/20


Nitroglycerin (Nitroglycerin) 0.4 Mg Sub, 0.4 MG SL ASDIRECTED for chest pain, 

#25 TAB


   1st sign of attack; may repeat every 5 mins; if pain persists after 3 in 15 

min, medical attention is recommended


   11/13/18





Review of Systems


Review of Systems


Constitutional:  Denies: Chills, Fever, Fatigue


Eyes:  Denies: Pain


HEENT:  Denies: Head Aches


Skin:  Denies: Rash


Breast:  Denies: New Breast Lumps / Masses, Nipple Retraction, Nipple Discharge,

Breast Skin Changes, Breast Pain or Tenderness, Other Breast Complaints


Pulmonary:  Denies: Dyspnea


Cardiovascular:  Denies: Chest Pain


Gastrointestinal:  Denies: Abdominal Pain


Musculoskeletal:  Denies: Neck pain, Back pain


Neurological:  Reports: Weakness; 


   Denies: Numbness


Psych:  Reports: Mood Normal





Physical Examination


Vital Signs


Wt 180 lbs


T 97.1


P 64


RR 16


/79


O2 98%


Pain 0


Fatigue 0


General Exam:  Positive: Alert, Cooperative, No Acute Distress


Eye Exam:  Positive: PERRLA, EOMI


ENT EXAM:  Positive: Atraumatic


Neck Exam:  Positive: Supple


Chest Exam:  Positive: Clear to auscultation


Heart Exam:  Positive: Rate Normal


Breast Exam:  Positive: Symmetric Bilaterally (Ptosis); 


   Negative: Lumps or Masses (Palpable fibrosis at surgical site right breast. 

No concerning right or left breast or axillary lumps or masses. ), Skin Changes


Abdomen Exam:  Positive: Soft


Extremity Exam:  Negative: Edema


Skin Exam:  Positive: Nl turgor and temperature


Neuro Exam:  Positive: Normal Gait, Normal Speech, Cranial Nerves 3-12 NL


Psych Exam:  Positive: Mental status NL


Diagnostic and Laboratory


Diagnostic Review


Radiologic images, relevant labs and pathology reports were personally reviewed 

and discussed with Ms. Peng.





Assessment and Plan


Impression


Assessment


Ms. Peng is a 81 year old female with a history of right breast cancer 

pT1cN0(sn)M0 ER/MO+ HER2- Grade 2 stage IA. She underwent lumpectomy and SLNB on

8/25/20 margins were negative. She then underwent adjuvant partial breast 

radiation 40 Gy in 15 fractions completed on 11/4/20. She has continued on an AI

with Dr. Quinn.





She is doing well, unfortunately suffered a stroke over the winter but has 

recovered largely to baseline. 





On exam today she has no concerning breast lesions. She is due for mammography 

this month and has follow up established with Dr. Quinn and Dr. Newell. 





She has no particular sequelae of radiation to the right breast except for palpa

ble asymptomatic fibrosis at the lumpectomy site. 





As she is doing well and is so well followed I will see her again in 1 year.


Performance Status


ECOG 1


Plan


Follow up in 12 months





Ms. Peng was encouraged to call with questions or concerns in the interim 

period.


Billing Statement


Total time of [22] minutes was spent preparing for the visit [1], obtaining HPI 

[5], examining the patient [2], reviewing diagnostic tests [2], discussing ma

nagement options [5], coordinating care [0], and writing this note [7].











EMILE FRANCES MD                May 5, 2021 10:44

## 2021-05-21 ENCOUNTER — HOSPITAL ENCOUNTER (OUTPATIENT)
Dept: HOSPITAL 53 - M WHC | Age: 81
End: 2021-05-21
Attending: SURGERY
Payer: MEDICARE

## 2021-05-21 DIAGNOSIS — C50.911: Primary | ICD-10-CM

## 2021-05-21 DIAGNOSIS — N63.14: ICD-10-CM

## 2021-05-21 PROCEDURE — 76642 ULTRASOUND BREAST LIMITED: CPT

## 2021-05-21 PROCEDURE — 77066 DX MAMMO INCL CAD BI: CPT

## 2021-05-21 NOTE — REP
INDICATION:

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



COMPARISON:

Comparison mammography May 19, 2020 and September 20, 2017.  Comparison sonography May

27, 2020 and July 6, 2020.



TECHNIQUE:

Routine views of the right and left breast are obtained.  3D tomography is carried

out.  Targeted right breast sonography is performed.



FINDINGS:

The previously noted right inferior medial breast mass is been removed. There are

multiple surgical clips in the right medial breast. Post treatment changes including

dermal thickening are seen. There is a loop recorder visible at the edge of the field

of view medially on the left. There is a 3.3 x 3.9 cm area of fat necrosis versus

seroma at the excision or biopsy site in the right breast.  An area of fat necrosis is

favored since there are lobules of fat density material within the lesion seen

mammographically..  The left breast, and the remainder of the right breast are

mammographically unremarkable.



The Volpara volumetric breast density pattern is B.



Targeted right breast sonography: Sonography demonstrates a complex fluid collection

at the lumpectomy site measuring 2.7 x 1.7 x 3.9 cm, 4 o'clock and position, less than

a cm from the nipple.  This is felt to account for the mixed density

well-circumscribed lesion seen mammographically.  Within the cystic lesion are 1 or 2

hyperechoic in a nodules which are felt to correspond to lobules of fat as seen on the

mammogram.  No other sonographic findings..



IMPRESSION:

BIRADS/ACR category 2 benign mammographic and right breast sonographic findings.  3.9

cm complex fluid collection representing postoperative fat necrosis in the right

breast at the lumpectomy site.  Post treatment changes.



This mammogram was interpreted with the aid of an FDA-approved computer-aided

detection system.



The patient states she had a clinical breast exam in April of 2021.



The patient letter being requested is M2.



RECOMMENDATION:

Repeat screening mammography recommended 1 year (for women over 40).





<Electronically signed by Sixto Martin > 05/21/21 [id number]

## 2022-03-20 ENCOUNTER — HOSPITAL ENCOUNTER (OUTPATIENT)
Dept: HOSPITAL 53 - M ED | Age: 82
Setting detail: OBSERVATION
LOS: 2 days | Discharge: HOME | End: 2022-03-22
Attending: INTERNAL MEDICINE | Admitting: FAMILY MEDICINE
Payer: MEDICARE

## 2022-03-20 VITALS — WEIGHT: 172.18 LBS | HEIGHT: 65 IN | BODY MASS INDEX: 28.69 KG/M2

## 2022-03-20 VITALS — DIASTOLIC BLOOD PRESSURE: 68 MMHG | SYSTOLIC BLOOD PRESSURE: 114 MMHG

## 2022-03-20 VITALS — DIASTOLIC BLOOD PRESSURE: 72 MMHG | SYSTOLIC BLOOD PRESSURE: 158 MMHG

## 2022-03-20 DIAGNOSIS — E78.5: ICD-10-CM

## 2022-03-20 DIAGNOSIS — K21.9: ICD-10-CM

## 2022-03-20 DIAGNOSIS — Z79.899: ICD-10-CM

## 2022-03-20 DIAGNOSIS — I63.9: Primary | ICD-10-CM

## 2022-03-20 DIAGNOSIS — R47.81: ICD-10-CM

## 2022-03-20 DIAGNOSIS — Z79.02: ICD-10-CM

## 2022-03-20 DIAGNOSIS — Z92.3: ICD-10-CM

## 2022-03-20 DIAGNOSIS — C50.911: ICD-10-CM

## 2022-03-20 DIAGNOSIS — I11.9: ICD-10-CM

## 2022-03-20 DIAGNOSIS — R53.1: ICD-10-CM

## 2022-03-20 DIAGNOSIS — G93.89: ICD-10-CM

## 2022-03-20 DIAGNOSIS — Z92.21: ICD-10-CM

## 2022-03-20 DIAGNOSIS — M19.90: ICD-10-CM

## 2022-03-20 DIAGNOSIS — Z95.1: ICD-10-CM

## 2022-03-20 DIAGNOSIS — Z86.73: ICD-10-CM

## 2022-03-20 DIAGNOSIS — Z95.818: ICD-10-CM

## 2022-03-20 DIAGNOSIS — E03.9: ICD-10-CM

## 2022-03-20 DIAGNOSIS — Z85.038: ICD-10-CM

## 2022-03-20 DIAGNOSIS — Z90.49: ICD-10-CM

## 2022-03-20 DIAGNOSIS — R29.700: ICD-10-CM

## 2022-03-20 DIAGNOSIS — I25.10: ICD-10-CM

## 2022-03-20 LAB
APTT BLD: 21.2 SECONDS (ref 25.9–37)
BASOPHILS # BLD AUTO: 0.1 10^3/UL (ref 0–0.2)
BASOPHILS NFR BLD AUTO: 1.3 % (ref 0–1)
CK MB CFR.DF SERPL CALC: 1.02
CK MB SERPL-MCNC: 1.4 NG/ML (ref ?–3.6)
CK SERPL-CCNC: 137 U/L (ref 26–192)
EOSINOPHIL # BLD AUTO: 1 10^3/UL (ref 0–0.5)
EOSINOPHIL NFR BLD AUTO: 14.8 % (ref 0–3)
HCT VFR BLD AUTO: 39.4 % (ref 36–47)
HGB BLD-MCNC: 12.9 G/DL (ref 12–15.5)
INR PPP: 0.95
LYMPHOCYTES # BLD AUTO: 2.6 10^3/UL (ref 1.5–5)
LYMPHOCYTES NFR BLD AUTO: 36.9 % (ref 24–44)
MCH RBC QN AUTO: 33.5 PG (ref 27–33)
MCHC RBC AUTO-ENTMCNC: 32.7 G/DL (ref 32–36.5)
MCV RBC AUTO: 102.3 FL (ref 80–96)
MONOCYTES # BLD AUTO: 0.6 10^3/UL (ref 0–0.8)
MONOCYTES NFR BLD AUTO: 8.4 % (ref 2–8)
NEUTROPHILS # BLD AUTO: 2.7 10^3/UL (ref 1.5–8.5)
NEUTROPHILS NFR BLD AUTO: 38.5 % (ref 36–66)
PLATELET # BLD AUTO: 169 10^3/UL (ref 150–450)
PROTHROMBIN TIME: 13.1 SECONDS (ref 12.7–14.5)
RBC # BLD AUTO: 3.85 10^6/UL (ref 4–5.4)
RSV RNA NPH QL NAA+PROBE: NEGATIVE
WBC # BLD AUTO: 6.9 10^3/UL (ref 4–10)

## 2022-03-20 PROCEDURE — 36415 COLL VENOUS BLD VENIPUNCTURE: CPT

## 2022-03-20 PROCEDURE — 92526 ORAL FUNCTION THERAPY: CPT

## 2022-03-20 PROCEDURE — 86850 RBC ANTIBODY SCREEN: CPT

## 2022-03-20 PROCEDURE — 80061 LIPID PANEL: CPT

## 2022-03-20 PROCEDURE — 84484 ASSAY OF TROPONIN QUANT: CPT

## 2022-03-20 PROCEDURE — 94760 N-INVAS EAR/PLS OXIMETRY 1: CPT

## 2022-03-20 PROCEDURE — 86900 BLOOD TYPING SEROLOGIC ABO: CPT

## 2022-03-20 PROCEDURE — 83036 HEMOGLOBIN GLYCOSYLATED A1C: CPT

## 2022-03-20 PROCEDURE — 93041 RHYTHM ECG TRACING: CPT

## 2022-03-20 PROCEDURE — 85025 COMPLETE CBC W/AUTO DIFF WBC: CPT

## 2022-03-20 PROCEDURE — 80047 BASIC METABLC PNL IONIZED CA: CPT

## 2022-03-20 PROCEDURE — 82550 ASSAY OF CK (CPK): CPT

## 2022-03-20 PROCEDURE — 87631 RESP VIRUS 3-5 TARGETS: CPT

## 2022-03-20 PROCEDURE — 92610 EVALUATE SWALLOWING FUNCTION: CPT

## 2022-03-20 PROCEDURE — 85730 THROMBOPLASTIN TIME PARTIAL: CPT

## 2022-03-20 PROCEDURE — 93306 TTE W/DOPPLER COMPLETE: CPT

## 2022-03-20 PROCEDURE — 70496 CT ANGIOGRAPHY HEAD: CPT

## 2022-03-20 PROCEDURE — 71045 X-RAY EXAM CHEST 1 VIEW: CPT

## 2022-03-20 PROCEDURE — 86901 BLOOD TYPING SEROLOGIC RH(D): CPT

## 2022-03-20 PROCEDURE — 70450 CT HEAD/BRAIN W/O DYE: CPT

## 2022-03-20 PROCEDURE — 83735 ASSAY OF MAGNESIUM: CPT

## 2022-03-20 PROCEDURE — 70498 CT ANGIOGRAPHY NECK: CPT

## 2022-03-20 PROCEDURE — 85610 PROTHROMBIN TIME: CPT

## 2022-03-20 PROCEDURE — 99285 EMERGENCY DEPT VISIT HI MDM: CPT

## 2022-03-20 PROCEDURE — 70551 MRI BRAIN STEM W/O DYE: CPT

## 2022-03-20 PROCEDURE — 82553 CREATINE MB FRACTION: CPT

## 2022-03-20 PROCEDURE — 93005 ELECTROCARDIOGRAM TRACING: CPT

## 2022-03-20 PROCEDURE — 97161 PT EVAL LOW COMPLEX 20 MIN: CPT

## 2022-03-20 PROCEDURE — 80048 BASIC METABOLIC PNL TOTAL CA: CPT

## 2022-03-20 RX ADMIN — DOCUSATE SODIUM SCH MG: 100 CAPSULE, LIQUID FILLED ORAL at 20:22

## 2022-03-21 VITALS — DIASTOLIC BLOOD PRESSURE: 72 MMHG | SYSTOLIC BLOOD PRESSURE: 151 MMHG

## 2022-03-21 VITALS — DIASTOLIC BLOOD PRESSURE: 58 MMHG | SYSTOLIC BLOOD PRESSURE: 126 MMHG

## 2022-03-21 LAB
BASOPHILS # BLD AUTO: 0.1 10^3/UL (ref 0–0.2)
BASOPHILS NFR BLD AUTO: 1.1 % (ref 0–1)
BUN SERPL-MCNC: 20 MG/DL (ref 7–18)
CALCIUM SERPL-MCNC: 8.8 MG/DL (ref 8.8–10.2)
CHLORIDE SERPL-SCNC: 111 MEQ/L (ref 98–107)
CHOLEST SERPL-MCNC: 146 MG/DL (ref ?–200)
CHOLEST/HDLC SERPL: 2.35 {RATIO} (ref ?–5)
CO2 SERPL-SCNC: 25 MEQ/L (ref 21–32)
CREAT SERPL-MCNC: 1.03 MG/DL (ref 0.55–1.3)
EOSINOPHIL # BLD AUTO: 0.9 10^3/UL (ref 0–0.5)
EOSINOPHIL NFR BLD AUTO: 12.9 % (ref 0–3)
EST. AVERAGE GLUCOSE BLD GHB EST-MCNC: 117 MG/DL (ref 60–110)
GFR SERPL CREATININE-BSD FRML MDRD: 54.7 ML/MIN/{1.73_M2} (ref 32–?)
GLUCOSE SERPL-MCNC: 88 MG/DL (ref 70–100)
HCT VFR BLD AUTO: 36.2 % (ref 36–47)
HDLC SERPL-MCNC: 62 MG/DL (ref 40–?)
HGB BLD-MCNC: 12.1 G/DL (ref 12–15.5)
LDLC SERPL CALC-MCNC: 62 MG/DL (ref ?–100)
LYMPHOCYTES # BLD AUTO: 3.4 10^3/UL (ref 1.5–5)
LYMPHOCYTES NFR BLD AUTO: 48.4 % (ref 24–44)
MAGNESIUM SERPL-MCNC: 2.3 MG/DL (ref 1.8–2.4)
MCH RBC QN AUTO: 33.5 PG (ref 27–33)
MCHC RBC AUTO-ENTMCNC: 33.4 G/DL (ref 32–36.5)
MCV RBC AUTO: 100.3 FL (ref 80–96)
MONOCYTES # BLD AUTO: 0.5 10^3/UL (ref 0–0.8)
MONOCYTES NFR BLD AUTO: 7.3 % (ref 2–8)
NEUTROPHILS # BLD AUTO: 2.1 10^3/UL (ref 1.5–8.5)
NEUTROPHILS NFR BLD AUTO: 30.2 % (ref 36–66)
NONHDLC SERPL-MCNC: 84 MG/DL
PLATELET # BLD AUTO: 152 10^3/UL (ref 150–450)
POTASSIUM SERPL-SCNC: 4.5 MEQ/L (ref 3.5–5.1)
RBC # BLD AUTO: 3.61 10^6/UL (ref 4–5.4)
SODIUM SERPL-SCNC: 141 MEQ/L (ref 136–145)
TRIGL SERPL-MCNC: 110 MG/DL (ref ?–150)
WBC # BLD AUTO: 7 10^3/UL (ref 4–10)

## 2022-03-21 RX ADMIN — LEVOTHYROXINE SODIUM SCH MCG: 88 TABLET ORAL at 05:19

## 2022-03-21 RX ADMIN — DOCUSATE SODIUM SCH MG: 100 CAPSULE, LIQUID FILLED ORAL at 09:22

## 2022-03-21 RX ADMIN — DOCUSATE SODIUM SCH MG: 100 CAPSULE, LIQUID FILLED ORAL at 21:16

## 2022-03-21 RX ADMIN — CLOPIDOGREL BISULFATE SCH MG: 75 TABLET ORAL at 09:22

## 2022-03-21 RX ADMIN — SIMVASTATIN SCH MG: 40 TABLET, FILM COATED ORAL at 09:23

## 2022-03-21 RX ADMIN — ASPIRIN SCH MG: 81 TABLET ORAL at 09:22

## 2022-03-21 RX ADMIN — Medication SCH EA: at 14:43

## 2022-03-21 RX ADMIN — METOPROLOL TARTRATE SCH MG: 25 TABLET, FILM COATED ORAL at 21:16

## 2022-03-21 RX ADMIN — METOPROLOL TARTRATE SCH MG: 25 TABLET, FILM COATED ORAL at 09:23

## 2022-03-21 RX ADMIN — LISINOPRIL SCH MG: 2.5 TABLET ORAL at 09:23

## 2022-03-22 VITALS — DIASTOLIC BLOOD PRESSURE: 70 MMHG | SYSTOLIC BLOOD PRESSURE: 138 MMHG

## 2022-03-22 VITALS — DIASTOLIC BLOOD PRESSURE: 72 MMHG | SYSTOLIC BLOOD PRESSURE: 151 MMHG

## 2022-03-22 VITALS — DIASTOLIC BLOOD PRESSURE: 71 MMHG | SYSTOLIC BLOOD PRESSURE: 140 MMHG

## 2022-03-22 LAB
BASOPHILS # BLD AUTO: 0.1 10^3/UL (ref 0–0.2)
BASOPHILS NFR BLD AUTO: 0.7 % (ref 0–1)
BUN SERPL-MCNC: 24 MG/DL (ref 7–18)
CALCIUM SERPL-MCNC: 9.1 MG/DL (ref 8.8–10.2)
CHLORIDE SERPL-SCNC: 111 MEQ/L (ref 98–107)
CO2 SERPL-SCNC: 26 MEQ/L (ref 21–32)
CREAT SERPL-MCNC: 1 MG/DL (ref 0.55–1.3)
EOSINOPHIL # BLD AUTO: 1 10^3/UL (ref 0–0.5)
EOSINOPHIL NFR BLD AUTO: 13.5 % (ref 0–3)
GFR SERPL CREATININE-BSD FRML MDRD: 56.6 ML/MIN/{1.73_M2} (ref 32–?)
GLUCOSE SERPL-MCNC: 92 MG/DL (ref 70–100)
HCT VFR BLD AUTO: 36.7 % (ref 36–47)
HGB BLD-MCNC: 12 G/DL (ref 12–15.5)
LYMPHOCYTES # BLD AUTO: 3 10^3/UL (ref 1.5–5)
LYMPHOCYTES NFR BLD AUTO: 41.1 % (ref 24–44)
MAGNESIUM SERPL-MCNC: 2.2 MG/DL (ref 1.8–2.4)
MCH RBC QN AUTO: 33.1 PG (ref 27–33)
MCHC RBC AUTO-ENTMCNC: 32.7 G/DL (ref 32–36.5)
MCV RBC AUTO: 101.4 FL (ref 80–96)
MONOCYTES # BLD AUTO: 0.7 10^3/UL (ref 0–0.8)
MONOCYTES NFR BLD AUTO: 9.4 % (ref 2–8)
NEUTROPHILS # BLD AUTO: 2.6 10^3/UL (ref 1.5–8.5)
NEUTROPHILS NFR BLD AUTO: 35.2 % (ref 36–66)
PLATELET # BLD AUTO: 155 10^3/UL (ref 150–450)
POTASSIUM SERPL-SCNC: 4.4 MEQ/L (ref 3.5–5.1)
RBC # BLD AUTO: 3.62 10^6/UL (ref 4–5.4)
SODIUM SERPL-SCNC: 142 MEQ/L (ref 136–145)
WBC # BLD AUTO: 7.3 10^3/UL (ref 4–10)

## 2022-03-22 RX ADMIN — LEVOTHYROXINE SODIUM SCH MCG: 88 TABLET ORAL at 05:50

## 2022-03-22 RX ADMIN — LISINOPRIL SCH MG: 2.5 TABLET ORAL at 09:26

## 2022-03-22 RX ADMIN — DOCUSATE SODIUM SCH MG: 100 CAPSULE, LIQUID FILLED ORAL at 09:23

## 2022-03-22 RX ADMIN — METOPROLOL TARTRATE SCH MG: 25 TABLET, FILM COATED ORAL at 09:26

## 2022-03-22 RX ADMIN — ASPIRIN SCH MG: 81 TABLET ORAL at 09:23

## 2022-03-22 RX ADMIN — Medication SCH EA: at 09:22

## 2022-03-22 RX ADMIN — CLOPIDOGREL BISULFATE SCH MG: 75 TABLET ORAL at 09:23

## 2022-03-22 RX ADMIN — SIMVASTATIN SCH MG: 40 TABLET, FILM COATED ORAL at 09:23

## 2022-05-18 ENCOUNTER — HOSPITAL ENCOUNTER (OUTPATIENT)
Dept: HOSPITAL 53 - M ONCR | Age: 82
End: 2022-05-18
Attending: RADIOLOGY
Payer: MEDICARE

## 2022-05-18 DIAGNOSIS — Z92.3: ICD-10-CM

## 2022-05-18 DIAGNOSIS — Z08: Primary | ICD-10-CM

## 2022-05-18 DIAGNOSIS — Z79.811: ICD-10-CM

## 2022-05-18 DIAGNOSIS — Z79.899: ICD-10-CM

## 2022-05-18 DIAGNOSIS — Z85.3: ICD-10-CM

## 2022-05-18 DIAGNOSIS — Z86.73: ICD-10-CM

## 2022-05-23 ENCOUNTER — HOSPITAL ENCOUNTER (OUTPATIENT)
Dept: HOSPITAL 53 - M WHC | Age: 82
End: 2022-05-23
Attending: INTERNAL MEDICINE
Payer: MEDICARE

## 2022-05-23 DIAGNOSIS — Z12.31: Primary | ICD-10-CM

## 2022-08-18 ENCOUNTER — HOSPITAL ENCOUNTER (OUTPATIENT)
Dept: HOSPITAL 53 - M WUC | Age: 82
End: 2022-08-18
Attending: PHYSICIAN ASSISTANT
Payer: MEDICARE

## 2022-08-18 DIAGNOSIS — J20.9: Primary | ICD-10-CM

## 2023-02-20 ENCOUNTER — HOSPITAL ENCOUNTER (INPATIENT)
Dept: HOSPITAL 53 - M ED | Age: 83
LOS: 2 days | Discharge: HOME | DRG: 813 | End: 2023-02-22
Attending: INTERNAL MEDICINE | Admitting: INTERNAL MEDICINE
Payer: MEDICARE

## 2023-02-20 VITALS — DIASTOLIC BLOOD PRESSURE: 71 MMHG | SYSTOLIC BLOOD PRESSURE: 158 MMHG

## 2023-02-20 VITALS — SYSTOLIC BLOOD PRESSURE: 154 MMHG | DIASTOLIC BLOOD PRESSURE: 70 MMHG

## 2023-02-20 VITALS — BODY MASS INDEX: 33.68 KG/M2 | WEIGHT: 202.16 LBS | HEIGHT: 65 IN

## 2023-02-20 VITALS — SYSTOLIC BLOOD PRESSURE: 144 MMHG | DIASTOLIC BLOOD PRESSURE: 67 MMHG

## 2023-02-20 VITALS — SYSTOLIC BLOOD PRESSURE: 181 MMHG | DIASTOLIC BLOOD PRESSURE: 77 MMHG

## 2023-02-20 DIAGNOSIS — Z79.82: ICD-10-CM

## 2023-02-20 DIAGNOSIS — Z86.73: ICD-10-CM

## 2023-02-20 DIAGNOSIS — E03.9: ICD-10-CM

## 2023-02-20 DIAGNOSIS — K21.9: ICD-10-CM

## 2023-02-20 DIAGNOSIS — K92.0: ICD-10-CM

## 2023-02-20 DIAGNOSIS — I95.1: ICD-10-CM

## 2023-02-20 DIAGNOSIS — Z85.038: ICD-10-CM

## 2023-02-20 DIAGNOSIS — Z95.5: ICD-10-CM

## 2023-02-20 DIAGNOSIS — K92.2: ICD-10-CM

## 2023-02-20 DIAGNOSIS — Z85.3: ICD-10-CM

## 2023-02-20 DIAGNOSIS — Z20.822: ICD-10-CM

## 2023-02-20 DIAGNOSIS — M19.90: ICD-10-CM

## 2023-02-20 DIAGNOSIS — I25.10: ICD-10-CM

## 2023-02-20 DIAGNOSIS — Z92.3: ICD-10-CM

## 2023-02-20 DIAGNOSIS — D62: ICD-10-CM

## 2023-02-20 DIAGNOSIS — I10: ICD-10-CM

## 2023-02-20 DIAGNOSIS — E78.5: ICD-10-CM

## 2023-02-20 DIAGNOSIS — D68.32: Primary | ICD-10-CM

## 2023-02-20 DIAGNOSIS — Z90.49: ICD-10-CM

## 2023-02-20 DIAGNOSIS — Z79.899: ICD-10-CM

## 2023-02-20 DIAGNOSIS — Z79.890: ICD-10-CM

## 2023-02-20 LAB
ALBUMIN SERPL BCG-MCNC: 3.3 G/DL (ref 3.2–5.2)
ALP SERPL-CCNC: 36 U/L (ref 46–116)
ALT SERPL W P-5'-P-CCNC: 17 U/L (ref 7–40)
APTT BLD: 26.3 SECONDS (ref 24.8–34.2)
AST SERPL-CCNC: 26 U/L (ref ?–34)
BASE EXCESS BLDV CALC-SCNC: -5.6 MMOL/L (ref -2–2)
BASOPHILS # BLD AUTO: 0.1 10^3/UL (ref 0–0.2)
BASOPHILS NFR BLD AUTO: 0.6 % (ref 0–1)
BILIRUB SERPL-MCNC: 0.6 MG/DL (ref 0.3–1.2)
BUN SERPL-MCNC: 50 MG/DL (ref 9–23)
CALCIUM SERPL-MCNC: 8.9 MG/DL (ref 8.3–10.6)
CHLORIDE SERPL-SCNC: 107 MMOL/L (ref 98–107)
CO2 BLDV CALC-SCNC: 21.1 MEQ/L (ref 24–28)
CO2 SERPL-SCNC: 24 MMOL/L (ref 20–31)
CREAT SERPL-MCNC: 1.31 MG/DL (ref 0.55–1.3)
EOSINOPHIL # BLD AUTO: 0.1 10^3/UL (ref 0–0.5)
EOSINOPHIL NFR BLD AUTO: 1.3 % (ref 0–3)
GFR SERPL CREATININE-BSD FRML MDRD: 41.4 ML/MIN/{1.73_M2} (ref 32–?)
GLUCOSE SERPL-MCNC: 123 MG/DL (ref 74–106)
HCO3 BLDV-SCNC: 19.9 MEQ/L (ref 23–27)
HCO3 STD BLDV-SCNC: 19.3 MEQ/L
HCT VFR BLD AUTO: 29.2 % (ref 36–47)
HCT VFR BLD AUTO: 31.3 % (ref 36–47)
HCT VFR BLD AUTO: 34.1 % (ref 36–47)
HGB BLD-MCNC: 10.2 G/DL (ref 12–15.5)
HGB BLD-MCNC: 11 G/DL (ref 12–15.5)
HGB BLD-MCNC: 9.6 G/DL (ref 12–15.5)
INR PPP: 1.11
LIPASE SERPL-CCNC: 61 U/L (ref 12–53)
LYMPHOCYTES # BLD AUTO: 1.9 10^3/UL (ref 1.5–5)
LYMPHOCYTES NFR BLD AUTO: 23.9 % (ref 24–44)
MCH RBC QN AUTO: 33.3 PG (ref 27–33)
MCH RBC QN AUTO: 33.7 PG (ref 27–33)
MCHC RBC AUTO-ENTMCNC: 32.3 G/DL (ref 32–36.5)
MCHC RBC AUTO-ENTMCNC: 32.6 G/DL (ref 32–36.5)
MCV RBC AUTO: 103.3 FL (ref 80–96)
MCV RBC AUTO: 103.3 FL (ref 80–96)
MONOCYTES # BLD AUTO: 0.6 10^3/UL (ref 0–0.8)
MONOCYTES NFR BLD AUTO: 7 % (ref 2–8)
NEUTROPHILS # BLD AUTO: 5.3 10^3/UL (ref 1.5–8.5)
NEUTROPHILS NFR BLD AUTO: 66.8 % (ref 36–66)
PCO2 BLDV: 39.3 MMHG (ref 38–50)
PH BLDV: 7.32 UNITS (ref 7.33–7.43)
PLATELET # BLD AUTO: 148 10^3/UL (ref 150–450)
PLATELET # BLD AUTO: 163 10^3/UL (ref 150–450)
PO2 BLDV: 39.3 MMHG (ref 30–50)
POTASSIUM SERPL-SCNC: 5 MMOL/L (ref 3.5–5.1)
PROT SERPL-MCNC: 6.2 G/DL (ref 5.7–8.2)
PROTHROMBIN TIME: 14.5 SECONDS (ref 12.5–14.5)
RBC # BLD AUTO: 3.03 10^6/UL (ref 4–5.4)
RBC # BLD AUTO: 3.3 10^6/UL (ref 4–5.4)
SAO2 % BLDV: 67.7 % (ref 60–80)
SODIUM SERPL-SCNC: 139 MMOL/L (ref 136–145)
WBC # BLD AUTO: 7.9 10^3/UL (ref 4–10)
WBC # BLD AUTO: 8.5 10^3/UL (ref 4–10)

## 2023-02-20 RX ADMIN — SUCRALFATE SCH GM: 1 SUSPENSION ORAL at 18:34

## 2023-02-20 RX ADMIN — DEXTROSE MONOHYDRATE SCH MLS/HR: 50 INJECTION, SOLUTION INTRAVENOUS at 18:01

## 2023-02-20 RX ADMIN — ASPIRIN SCH MG: 81 TABLET ORAL at 21:29

## 2023-02-20 RX ADMIN — DEXTROSE MONOHYDRATE SCH MLS/HR: 50 INJECTION, SOLUTION INTRAVENOUS at 22:51

## 2023-02-20 RX ADMIN — SUCRALFATE SCH GM: 1 SUSPENSION ORAL at 23:33

## 2023-02-21 VITALS — DIASTOLIC BLOOD PRESSURE: 89 MMHG | SYSTOLIC BLOOD PRESSURE: 123 MMHG

## 2023-02-21 VITALS — DIASTOLIC BLOOD PRESSURE: 60 MMHG | SYSTOLIC BLOOD PRESSURE: 130 MMHG

## 2023-02-21 VITALS — DIASTOLIC BLOOD PRESSURE: 62 MMHG | SYSTOLIC BLOOD PRESSURE: 142 MMHG

## 2023-02-21 VITALS — DIASTOLIC BLOOD PRESSURE: 63 MMHG | SYSTOLIC BLOOD PRESSURE: 134 MMHG

## 2023-02-21 VITALS — SYSTOLIC BLOOD PRESSURE: 130 MMHG | DIASTOLIC BLOOD PRESSURE: 92 MMHG

## 2023-02-21 VITALS — DIASTOLIC BLOOD PRESSURE: 58 MMHG | SYSTOLIC BLOOD PRESSURE: 118 MMHG

## 2023-02-21 VITALS — SYSTOLIC BLOOD PRESSURE: 125 MMHG | DIASTOLIC BLOOD PRESSURE: 60 MMHG

## 2023-02-21 VITALS — SYSTOLIC BLOOD PRESSURE: 133 MMHG | DIASTOLIC BLOOD PRESSURE: 60 MMHG

## 2023-02-21 VITALS — SYSTOLIC BLOOD PRESSURE: 133 MMHG | DIASTOLIC BLOOD PRESSURE: 63 MMHG

## 2023-02-21 VITALS — DIASTOLIC BLOOD PRESSURE: 56 MMHG | SYSTOLIC BLOOD PRESSURE: 117 MMHG

## 2023-02-21 VITALS — DIASTOLIC BLOOD PRESSURE: 40 MMHG | SYSTOLIC BLOOD PRESSURE: 73 MMHG

## 2023-02-21 VITALS — SYSTOLIC BLOOD PRESSURE: 139 MMHG | DIASTOLIC BLOOD PRESSURE: 83 MMHG

## 2023-02-21 VITALS — SYSTOLIC BLOOD PRESSURE: 91 MMHG | DIASTOLIC BLOOD PRESSURE: 54 MMHG

## 2023-02-21 LAB
BASOPHILS # BLD AUTO: 0.1 10^3/UL (ref 0–0.2)
BASOPHILS NFR BLD AUTO: 0.7 % (ref 0–1)
BUN SERPL-MCNC: 39 MG/DL (ref 9–23)
CALCIUM SERPL-MCNC: 8.1 MG/DL (ref 8.3–10.6)
CHLORIDE SERPL-SCNC: 111 MMOL/L (ref 98–107)
CO2 SERPL-SCNC: 25 MMOL/L (ref 20–31)
CREAT SERPL-MCNC: 1.23 MG/DL (ref 0.55–1.3)
EOSINOPHIL # BLD AUTO: 0.2 10^3/UL (ref 0–0.5)
EOSINOPHIL NFR BLD AUTO: 3.3 % (ref 0–3)
GFR SERPL CREATININE-BSD FRML MDRD: 44.5 ML/MIN/{1.73_M2} (ref 32–?)
GLUCOSE SERPL-MCNC: 98 MG/DL (ref 74–106)
HCT VFR BLD AUTO: 29.1 % (ref 36–47)
HCT VFR BLD AUTO: 30 % (ref 36–47)
HCT VFR BLD AUTO: 30.6 % (ref 36–47)
HGB BLD-MCNC: 10 G/DL (ref 12–15.5)
HGB BLD-MCNC: 9.5 G/DL (ref 12–15.5)
HGB BLD-MCNC: 9.6 G/DL (ref 12–15.5)
LYMPHOCYTES # BLD AUTO: 3.1 10^3/UL (ref 1.5–5)
LYMPHOCYTES NFR BLD AUTO: 43.9 % (ref 24–44)
MAGNESIUM SERPL-MCNC: 2.1 MG/DL (ref 1.8–2.4)
MAGNESIUM SERPL-MCNC: 2.1 MG/DL (ref 1.8–2.4)
MCH RBC QN AUTO: 34.1 PG (ref 27–33)
MCHC RBC AUTO-ENTMCNC: 32.6 G/DL (ref 32–36.5)
MCV RBC AUTO: 104.3 FL (ref 80–96)
MONOCYTES # BLD AUTO: 0.7 10^3/UL (ref 0–0.8)
MONOCYTES NFR BLD AUTO: 10 % (ref 2–8)
NEUTROPHILS # BLD AUTO: 2.9 10^3/UL (ref 1.5–8.5)
NEUTROPHILS NFR BLD AUTO: 41.8 % (ref 36–66)
PLATELET # BLD AUTO: 134 10^3/UL (ref 150–450)
POTASSIUM SERPL-SCNC: 4.2 MMOL/L (ref 3.5–5.1)
POTASSIUM SERPL-SCNC: 4.3 MMOL/L (ref 3.5–5.1)
RBC # BLD AUTO: 2.79 10^6/UL (ref 4–5.4)
SODIUM SERPL-SCNC: 142 MMOL/L (ref 136–145)
WBC # BLD AUTO: 7 10^3/UL (ref 4–10)

## 2023-02-21 RX ADMIN — SUCRALFATE SCH GM: 1 SUSPENSION ORAL at 23:55

## 2023-02-21 RX ADMIN — SIMVASTATIN SCH MG: 40 TABLET, FILM COATED ORAL at 09:43

## 2023-02-21 RX ADMIN — LEVOTHYROXINE SODIUM SCH MCG: 88 TABLET ORAL at 05:12

## 2023-02-21 RX ADMIN — DEXTROSE MONOHYDRATE SCH MG: 50 INJECTION, SOLUTION INTRAVENOUS at 09:42

## 2023-02-21 RX ADMIN — METOPROLOL TARTRATE SCH MG: 25 TABLET, FILM COATED ORAL at 20:32

## 2023-02-21 RX ADMIN — DEXTROSE MONOHYDRATE SCH MLS/HR: 50 INJECTION, SOLUTION INTRAVENOUS at 04:17

## 2023-02-21 RX ADMIN — SUCRALFATE SCH GM: 1 SUSPENSION ORAL at 18:03

## 2023-02-21 RX ADMIN — ASPIRIN SCH MG: 81 TABLET ORAL at 09:42

## 2023-02-21 RX ADMIN — ASPIRIN SCH MG: 81 TABLET ORAL at 20:21

## 2023-02-21 RX ADMIN — DOCUSATE SODIUM SCH MG: 100 CAPSULE, LIQUID FILLED ORAL at 20:29

## 2023-02-21 RX ADMIN — DEXTROSE MONOHYDRATE SCH MG: 50 INJECTION, SOLUTION INTRAVENOUS at 20:21

## 2023-02-21 RX ADMIN — SUCRALFATE SCH GM: 1 SUSPENSION ORAL at 06:02

## 2023-02-21 RX ADMIN — METOPROLOL TARTRATE SCH MG: 25 TABLET, FILM COATED ORAL at 09:00

## 2023-02-21 RX ADMIN — SUCRALFATE SCH GM: 1 SUSPENSION ORAL at 12:00

## 2023-02-21 RX ADMIN — DOCUSATE SODIUM SCH MG: 100 CAPSULE, LIQUID FILLED ORAL at 09:00

## 2023-02-22 VITALS — SYSTOLIC BLOOD PRESSURE: 123 MMHG | DIASTOLIC BLOOD PRESSURE: 75 MMHG

## 2023-02-22 VITALS — DIASTOLIC BLOOD PRESSURE: 79 MMHG | SYSTOLIC BLOOD PRESSURE: 140 MMHG

## 2023-02-22 LAB
BASOPHILS # BLD AUTO: 0.1 10^3/UL (ref 0–0.2)
BASOPHILS NFR BLD AUTO: 0.8 % (ref 0–1)
BUN SERPL-MCNC: 39 MG/DL (ref 9–23)
CALCIUM SERPL-MCNC: 8.6 MG/DL (ref 8.3–10.6)
CHLORIDE SERPL-SCNC: 110 MMOL/L (ref 98–107)
CO2 SERPL-SCNC: 24 MMOL/L (ref 20–31)
CREAT SERPL-MCNC: 1.39 MG/DL (ref 0.55–1.3)
EOSINOPHIL # BLD AUTO: 0.5 10^3/UL (ref 0–0.5)
EOSINOPHIL NFR BLD AUTO: 7 % (ref 0–3)
FERRITIN SERPL-MCNC: 70.6 NG/ML (ref 7.3–270.7)
FOLATE SERPL-MCNC: 20.5 NG/ML (ref 5.4–?)
GFR SERPL CREATININE-BSD FRML MDRD: 38.6 ML/MIN/{1.73_M2} (ref 32–?)
GLUCOSE SERPL-MCNC: 95 MG/DL (ref 74–106)
HCT VFR BLD AUTO: 27.7 % (ref 36–47)
HGB BLD-MCNC: 9 G/DL (ref 12–15.5)
IRON SATN MFR SERPL: 24 % (ref 13.2–45)
IRON SERPL-MCNC: 60 UG/DL (ref 50–170)
LYMPHOCYTES # BLD AUTO: 3.4 10^3/UL (ref 1.5–5)
LYMPHOCYTES NFR BLD AUTO: 46.8 % (ref 24–44)
MCH RBC QN AUTO: 34.2 PG (ref 27–33)
MCHC RBC AUTO-ENTMCNC: 32.5 G/DL (ref 32–36.5)
MCV RBC AUTO: 105.3 FL (ref 80–96)
MONOCYTES # BLD AUTO: 0.7 10^3/UL (ref 0–0.8)
MONOCYTES NFR BLD AUTO: 10 % (ref 2–8)
NEUTROPHILS # BLD AUTO: 2.6 10^3/UL (ref 1.5–8.5)
NEUTROPHILS NFR BLD AUTO: 35.1 % (ref 36–66)
PLATELET # BLD AUTO: 121 10^3/UL (ref 150–450)
POTASSIUM SERPL-SCNC: 4.5 MMOL/L (ref 3.5–5.1)
RBC # BLD AUTO: 2.63 10^6/UL (ref 4–5.4)
SODIUM SERPL-SCNC: 142 MMOL/L (ref 136–145)
TIBC SERPL-MCNC: 250 UG/DL (ref 250–425)
VIT B12 SERPL-MCNC: 384 PG/ML (ref 211–911)
WBC # BLD AUTO: 7.3 10^3/UL (ref 4–10)

## 2023-02-22 RX ADMIN — ASPIRIN SCH MG: 81 TABLET ORAL at 08:38

## 2023-02-22 RX ADMIN — METOPROLOL TARTRATE SCH MG: 25 TABLET, FILM COATED ORAL at 08:38

## 2023-02-22 RX ADMIN — LEVOTHYROXINE SODIUM SCH MCG: 88 TABLET ORAL at 05:21

## 2023-02-22 RX ADMIN — SIMVASTATIN SCH MG: 40 TABLET, FILM COATED ORAL at 08:38

## 2023-02-22 RX ADMIN — DEXTROSE MONOHYDRATE SCH MG: 50 INJECTION, SOLUTION INTRAVENOUS at 08:37

## 2023-02-22 RX ADMIN — DOCUSATE SODIUM SCH MG: 100 CAPSULE, LIQUID FILLED ORAL at 08:38

## 2023-02-22 RX ADMIN — SUCRALFATE SCH GM: 1 SUSPENSION ORAL at 05:21

## 2023-05-04 ENCOUNTER — HOSPITAL ENCOUNTER (OUTPATIENT)
Dept: HOSPITAL 53 - M OPP | Age: 83
Discharge: HOME | End: 2023-05-04
Attending: SURGERY
Payer: MEDICARE

## 2023-05-04 VITALS — SYSTOLIC BLOOD PRESSURE: 136 MMHG | DIASTOLIC BLOOD PRESSURE: 63 MMHG

## 2023-05-04 VITALS — WEIGHT: 188.5 LBS | BODY MASS INDEX: 32.18 KG/M2 | HEIGHT: 64 IN

## 2023-05-04 DIAGNOSIS — Z79.82: ICD-10-CM

## 2023-05-04 DIAGNOSIS — K22.70: ICD-10-CM

## 2023-05-04 DIAGNOSIS — Z86.010: ICD-10-CM

## 2023-05-04 DIAGNOSIS — K92.0: ICD-10-CM

## 2023-05-04 DIAGNOSIS — Z79.02: ICD-10-CM

## 2023-05-04 DIAGNOSIS — Z85.068: ICD-10-CM

## 2023-05-04 DIAGNOSIS — K44.9: ICD-10-CM

## 2023-05-04 DIAGNOSIS — Z12.11: Primary | ICD-10-CM

## 2023-05-04 DIAGNOSIS — K31.89: ICD-10-CM

## 2023-05-04 DIAGNOSIS — Z79.811: ICD-10-CM

## 2023-05-04 DIAGNOSIS — Z79.899: ICD-10-CM

## 2023-05-04 PROCEDURE — 88305 TISSUE EXAM BY PATHOLOGIST: CPT

## 2023-05-04 PROCEDURE — 43239 EGD BIOPSY SINGLE/MULTIPLE: CPT

## 2023-05-24 ENCOUNTER — HOSPITAL ENCOUNTER (OUTPATIENT)
Dept: HOSPITAL 53 - M WHC | Age: 83
End: 2023-05-24
Attending: NURSE PRACTITIONER
Payer: MEDICARE

## 2023-05-24 DIAGNOSIS — C50.911: Primary | ICD-10-CM

## 2023-05-24 PROCEDURE — 77066 DX MAMMO INCL CAD BI: CPT

## 2023-06-09 ENCOUNTER — HOSPITAL ENCOUNTER (OUTPATIENT)
Dept: HOSPITAL 53 - M RAD | Age: 83
End: 2023-06-09
Attending: PHYSICIAN ASSISTANT
Payer: MEDICARE

## 2023-06-09 DIAGNOSIS — R10.9: ICD-10-CM

## 2023-06-09 DIAGNOSIS — K21.9: Primary | ICD-10-CM

## 2023-06-26 ENCOUNTER — HOSPITAL ENCOUNTER (OUTPATIENT)
Dept: HOSPITAL 53 - M RAD | Age: 83
End: 2023-06-26
Attending: FAMILY MEDICINE
Payer: MEDICARE

## 2023-06-26 DIAGNOSIS — R04.2: Primary | ICD-10-CM

## 2023-08-08 ENCOUNTER — HOSPITAL ENCOUNTER (OUTPATIENT)
Dept: HOSPITAL 53 - M RAD | Age: 83
End: 2023-08-08
Attending: SURGERY
Payer: MEDICARE

## 2023-08-08 DIAGNOSIS — K21.9: Primary | ICD-10-CM

## 2023-08-08 DIAGNOSIS — K44.9: ICD-10-CM

## 2023-08-08 PROCEDURE — 78264 GASTRIC EMPTYING IMG STUDY: CPT

## 2023-08-17 ENCOUNTER — HOSPITAL ENCOUNTER (OUTPATIENT)
Dept: HOSPITAL 53 - M WUC | Age: 83
End: 2023-08-17
Attending: FAMILY MEDICINE
Payer: MEDICARE

## 2023-08-17 DIAGNOSIS — M25.59: Primary | ICD-10-CM

## 2023-09-13 ENCOUNTER — HOSPITAL ENCOUNTER (OUTPATIENT)
Dept: HOSPITAL 53 - M RAD | Age: 83
End: 2023-09-13
Attending: FAMILY MEDICINE
Payer: MEDICARE

## 2023-09-13 DIAGNOSIS — I51.7: ICD-10-CM

## 2023-09-13 DIAGNOSIS — Z90.49: ICD-10-CM

## 2023-09-13 DIAGNOSIS — I25.10: ICD-10-CM

## 2023-09-13 DIAGNOSIS — I70.0: ICD-10-CM

## 2023-09-13 DIAGNOSIS — J47.9: ICD-10-CM

## 2023-09-13 DIAGNOSIS — R91.1: Primary | ICD-10-CM

## 2023-09-13 DIAGNOSIS — K44.9: ICD-10-CM

## 2023-09-13 DIAGNOSIS — Z95.1: ICD-10-CM

## 2023-09-13 PROCEDURE — 71260 CT THORAX DX C+: CPT

## 2024-01-26 ENCOUNTER — HOSPITAL ENCOUNTER (OUTPATIENT)
Dept: HOSPITAL 53 - M WHC | Age: 84
End: 2024-01-26
Payer: MEDICARE

## 2024-01-26 DIAGNOSIS — C50.919: ICD-10-CM

## 2024-01-26 DIAGNOSIS — M85.88: ICD-10-CM

## 2024-01-26 DIAGNOSIS — Z13.820: Primary | ICD-10-CM

## 2024-02-10 ENCOUNTER — HOSPITAL ENCOUNTER (OUTPATIENT)
Dept: HOSPITAL 53 - M RAD | Age: 84
End: 2024-02-10
Payer: MEDICARE

## 2024-02-10 DIAGNOSIS — M54.16: Primary | ICD-10-CM

## 2024-02-10 DIAGNOSIS — M54.31: ICD-10-CM

## 2024-05-30 ENCOUNTER — HOSPITAL ENCOUNTER (OUTPATIENT)
Dept: HOSPITAL 53 - M WHC | Age: 84
End: 2024-05-30
Attending: NURSE PRACTITIONER
Payer: MEDICARE

## 2024-05-30 DIAGNOSIS — C50.911: Primary | ICD-10-CM

## 2024-05-30 DIAGNOSIS — Z08: ICD-10-CM

## 2024-05-30 PROCEDURE — 77066 DX MAMMO INCL CAD BI: CPT

## 2024-06-25 ENCOUNTER — HOSPITAL ENCOUNTER (OUTPATIENT)
Dept: HOSPITAL 53 - M LAB REF | Age: 84
End: 2024-06-25
Attending: FAMILY MEDICINE
Payer: MEDICARE

## 2024-06-25 DIAGNOSIS — Z01.818: Primary | ICD-10-CM

## 2024-06-25 DIAGNOSIS — Z79.01: ICD-10-CM

## 2024-06-25 LAB
APTT BLD: 29.1 SECONDS (ref 24.8–34.2)
INR PPP: 1.08
PROTHROMBIN TIME: 13.6 SECONDS (ref 12.5–14.5)

## 2024-07-11 ENCOUNTER — HOSPITAL ENCOUNTER (OUTPATIENT)
Dept: HOSPITAL 53 - M LABWUC | Age: 84
End: 2024-07-11
Attending: PHYSICIAN ASSISTANT
Payer: MEDICARE

## 2024-07-11 DIAGNOSIS — I48.0: Primary | ICD-10-CM

## 2024-07-23 ENCOUNTER — HOSPITAL ENCOUNTER (OUTPATIENT)
Dept: HOSPITAL 53 - M PLAIMG | Age: 84
End: 2024-07-23
Attending: PHYSICIAN ASSISTANT
Payer: MEDICARE

## 2024-07-23 DIAGNOSIS — I48.0: Primary | ICD-10-CM

## 2024-07-23 DIAGNOSIS — I27.20: ICD-10-CM

## 2024-07-23 DIAGNOSIS — I08.3: ICD-10-CM

## 2024-07-31 ENCOUNTER — HOSPITAL ENCOUNTER (OUTPATIENT)
Dept: HOSPITAL 53 - M WUC | Age: 84
End: 2024-07-31
Attending: INTERNAL MEDICINE
Payer: MEDICARE

## 2024-07-31 DIAGNOSIS — I48.0: Primary | ICD-10-CM

## 2024-07-31 DIAGNOSIS — C50.911: ICD-10-CM

## 2024-07-31 LAB
ALBUMIN SERPL BCG-MCNC: 3.7 G/DL (ref 3.2–5.2)
ALP SERPL-CCNC: 36 U/L (ref 46–116)
ALT SERPL W P-5'-P-CCNC: 20 U/L (ref 7–40)
AST SERPL-CCNC: 20 U/L (ref ?–34)
BASOPHILS # BLD AUTO: 0.1 10^3/UL (ref 0–0.2)
BASOPHILS NFR BLD AUTO: 1 % (ref 0–1)
BILIRUB SERPL-MCNC: 0.8 MG/DL (ref 0.3–1.2)
BUN SERPL-MCNC: 23 MG/DL (ref 9–23)
CALCIUM SERPL-MCNC: 8.9 MG/DL (ref 8.3–10.6)
CANCER AG15-3 SERPL-ACNC: 7.7 U/ML (ref ?–32.4)
CHLORIDE SERPL-SCNC: 110 MMOL/L (ref 98–107)
CO2 SERPL-SCNC: 24 MMOL/L (ref 20–31)
CREAT SERPL-MCNC: 1.34 MG/DL (ref 0.55–1.3)
EOSINOPHIL # BLD AUTO: 0.4 10^3/UL (ref 0–0.5)
EOSINOPHIL NFR BLD AUTO: 6.5 % (ref 0–3)
GFR SERPL CREATININE-BSD FRML MDRD: 40.1 ML/MIN/{1.73_M2} (ref 32–?)
GLUCOSE SERPL-MCNC: 97 MG/DL (ref 74–106)
HCT VFR BLD AUTO: 37.3 % (ref 36–47)
HGB BLD-MCNC: 12.2 G/DL (ref 12–15.5)
LYMPHOCYTES # BLD AUTO: 2.2 10^3/UL (ref 1.5–5)
LYMPHOCYTES NFR BLD AUTO: 37.2 % (ref 24–44)
MAGNESIUM SERPL-MCNC: 2.1 MG/DL (ref 1.8–2.4)
MCH RBC QN AUTO: 35.7 PG (ref 27–33)
MCHC RBC AUTO-ENTMCNC: 32.7 G/DL (ref 32–36.5)
MCV RBC AUTO: 109.1 FL (ref 80–96)
MONOCYTES # BLD AUTO: 0.5 10^3/UL (ref 0–0.8)
MONOCYTES NFR BLD AUTO: 9.2 % (ref 2–8)
NEUTROPHILS # BLD AUTO: 2.7 10^3/UL (ref 1.5–8.5)
NEUTROPHILS NFR BLD AUTO: 45.9 % (ref 36–66)
PLATELET # BLD AUTO: 190 10^3/UL (ref 150–450)
POTASSIUM SERPL-SCNC: 4.6 MMOL/L (ref 3.5–5.1)
PROT SERPL-MCNC: 6.7 G/DL (ref 5.7–8.2)
RBC # BLD AUTO: 3.42 10^6/UL (ref 4–5.4)
SODIUM SERPL-SCNC: 141 MMOL/L (ref 136–145)
WBC # BLD AUTO: 5.9 10^3/UL (ref 4–10)

## 2024-08-12 ENCOUNTER — HOSPITAL ENCOUNTER (OUTPATIENT)
Dept: HOSPITAL 53 - M WUC | Age: 84
End: 2024-08-12
Attending: PHYSICIAN ASSISTANT
Payer: MEDICARE

## 2024-08-12 DIAGNOSIS — I48.0: Primary | ICD-10-CM

## 2024-08-12 LAB
HCT VFR BLD AUTO: 39.5 % (ref 36–47)
HGB BLD-MCNC: 12.8 G/DL (ref 12–15.5)
MCH RBC QN AUTO: 35.5 PG (ref 27–33)
MCHC RBC AUTO-ENTMCNC: 32.4 G/DL (ref 32–36.5)
MCV RBC AUTO: 109.4 FL (ref 80–96)
PLATELET # BLD AUTO: 188 10^3/UL (ref 150–450)
RBC # BLD AUTO: 3.61 10^6/UL (ref 4–5.4)
WBC # BLD AUTO: 6.5 10^3/UL (ref 4–10)

## 2024-10-10 ENCOUNTER — HOSPITAL ENCOUNTER (OUTPATIENT)
Dept: HOSPITAL 53 - M LAB REF | Age: 84
End: 2024-10-10
Attending: FAMILY MEDICINE
Payer: MEDICARE

## 2024-10-10 DIAGNOSIS — D64.9: Primary | ICD-10-CM

## 2024-10-11 LAB
FOLATE SERPL-MCNC: > 24 NG/ML (ref 5.4–?)
VIT B12 SERPL-MCNC: 573 PG/ML (ref 211–911)

## 2025-01-14 ENCOUNTER — HOSPITAL ENCOUNTER (OUTPATIENT)
Dept: HOSPITAL 53 - M WUC | Age: 85
End: 2025-01-14
Attending: INTERNAL MEDICINE
Payer: MEDICARE

## 2025-01-14 DIAGNOSIS — C50.311: Primary | ICD-10-CM

## 2025-01-14 LAB
ALBUMIN SERPL BCG-MCNC: 3.6 G/DL (ref 3.2–5.2)
ALP SERPL-CCNC: 38 U/L (ref 35–104)
ALT SERPL W P-5'-P-CCNC: 15 U/L (ref 7–40)
AST SERPL-CCNC: 20 U/L (ref ?–34)
BASOPHILS # BLD AUTO: 0.1 10^3/UL (ref 0–0.2)
BASOPHILS NFR BLD AUTO: 0.8 % (ref 0–1)
BILIRUB SERPL-MCNC: 0.5 MG/DL (ref 0.3–1.2)
BUN SERPL-MCNC: 28 MG/DL (ref 9–23)
CALCIUM SERPL-MCNC: 9.5 MG/DL (ref 8.3–10.6)
CANCER AG15-3 SERPL-ACNC: 6.1 U/ML (ref ?–32.4)
CHLORIDE SERPL-SCNC: 110 MMOL/L (ref 98–107)
CO2 SERPL-SCNC: 25 MMOL/L (ref 20–31)
CREAT SERPL-MCNC: 1.25 MG/DL (ref 0.55–1.3)
EOSINOPHIL # BLD AUTO: 0.3 10^3/UL (ref 0–0.5)
EOSINOPHIL NFR BLD AUTO: 4.1 % (ref 0–3)
GFR SERPL CREATININE-BSD FRML MDRD: 43.5 ML/MIN/{1.73_M2} (ref 32–?)
GLUCOSE SERPL-MCNC: 103 MG/DL (ref 74–106)
HCT VFR BLD AUTO: 38.2 % (ref 36–47)
HGB BLD-MCNC: 12.4 G/DL (ref 12–15.5)
LYMPHOCYTES # BLD AUTO: 2.1 10^3/UL (ref 1.5–5)
LYMPHOCYTES NFR BLD AUTO: 32.3 % (ref 24–44)
MAGNESIUM SERPL-MCNC: 2.2 MG/DL (ref 1.8–2.4)
MCH RBC QN AUTO: 34.8 PG (ref 27–33)
MCHC RBC AUTO-ENTMCNC: 32.5 G/DL (ref 32–36.5)
MCV RBC AUTO: 107.3 FL (ref 80–96)
MONOCYTES # BLD AUTO: 0.8 10^3/UL (ref 0–0.8)
MONOCYTES NFR BLD AUTO: 11.9 % (ref 2–8)
NEUTROPHILS # BLD AUTO: 3.4 10^3/UL (ref 1.5–8.5)
NEUTROPHILS NFR BLD AUTO: 50.7 % (ref 36–66)
PLATELET # BLD AUTO: 182 10^3/UL (ref 150–450)
POTASSIUM SERPL-SCNC: 5.1 MMOL/L (ref 3.5–5.1)
PROT SERPL-MCNC: 7.1 G/DL (ref 5.7–8.2)
RBC # BLD AUTO: 3.56 10^6/UL (ref 4–5.4)
SODIUM SERPL-SCNC: 141 MMOL/L (ref 136–145)
WBC # BLD AUTO: 6.6 10^3/UL (ref 4–10)

## 2025-03-13 ENCOUNTER — HOSPITAL ENCOUNTER (OUTPATIENT)
Dept: HOSPITAL 53 - M WUC | Age: 85
End: 2025-03-13
Attending: FAMILY MEDICINE
Payer: MEDICARE

## 2025-03-13 DIAGNOSIS — Z96.641: ICD-10-CM

## 2025-03-13 DIAGNOSIS — M25.551: Primary | ICD-10-CM

## 2025-07-15 ENCOUNTER — HOSPITAL ENCOUNTER (OUTPATIENT)
Dept: HOSPITAL 53 - M WUC | Age: 85
End: 2025-07-15
Attending: INTERNAL MEDICINE
Payer: MEDICARE

## 2025-07-15 DIAGNOSIS — C50.311: Primary | ICD-10-CM

## 2025-07-15 LAB
ALBUMIN SERPL BCG-MCNC: 3.7 G/DL (ref 3.2–5.2)
ALP SERPL-CCNC: 61 U/L (ref 35–104)
ALT SERPL W P-5'-P-CCNC: 19 U/L (ref 7–40)
AST SERPL-CCNC: 24 U/L (ref ?–34)
BASOPHILS # BLD AUTO: 0.1 10^3/UL (ref 0–0.2)
BASOPHILS NFR BLD AUTO: 1 % (ref 0–1)
BILIRUB SERPL-MCNC: 0.3 MG/DL (ref 0.3–1.2)
BUN SERPL-MCNC: 29 MG/DL (ref 9–23)
CALCIUM SERPL-MCNC: 10 MG/DL (ref 8.3–10.6)
CHLORIDE SERPL-SCNC: 106 MMOL/L (ref 98–107)
CO2 SERPL-SCNC: 25 MMOL/L (ref 20–31)
CREAT SERPL-MCNC: 1.56 MG/DL (ref 0.55–1.3)
EOSINOPHIL # BLD AUTO: 0.4 10^3/UL (ref 0–0.5)
EOSINOPHIL NFR BLD AUTO: 6 % (ref 0–3)
GFR SERPL CREATININE-BSD FRML MDRD: 32.4 ML/MIN/{1.73_M2} (ref 32–?)
GLUCOSE SERPL-MCNC: 88 MG/DL (ref 74–106)
HCT VFR BLD AUTO: 32 % (ref 36–47)
HGB BLD-MCNC: 9.9 G/DL (ref 12–15.5)
LYMPHOCYTES # BLD AUTO: 1.8 10^3/UL (ref 1.5–5)
LYMPHOCYTES NFR BLD AUTO: 24.9 % (ref 24–44)
MCH RBC QN AUTO: 32.8 PG (ref 27–33)
MCHC RBC AUTO-ENTMCNC: 30.9 G/DL (ref 32–36.5)
MCV RBC AUTO: 106 FL (ref 80–96)
MONOCYTES # BLD AUTO: 0.7 10^3/UL (ref 0–0.8)
MONOCYTES NFR BLD AUTO: 9.1 % (ref 2–8)
NEUTROPHILS # BLD AUTO: 4.2 10^3/UL (ref 1.5–8.5)
NEUTROPHILS NFR BLD AUTO: 58.9 % (ref 36–66)
PLATELET # BLD AUTO: 211 10^3/UL (ref 150–450)
POTASSIUM SERPL-SCNC: 5.5 MMOL/L (ref 3.5–5.1)
PROT SERPL-MCNC: 6.9 G/DL (ref 5.7–8.2)
RBC # BLD AUTO: 3.02 10^6/UL (ref 4–5.4)
SODIUM SERPL-SCNC: 144 MMOL/L (ref 136–145)
WBC # BLD AUTO: 7.1 10^3/UL (ref 4–10)